# Patient Record
Sex: FEMALE | Race: WHITE | Employment: STUDENT | URBAN - METROPOLITAN AREA
[De-identification: names, ages, dates, MRNs, and addresses within clinical notes are randomized per-mention and may not be internally consistent; named-entity substitution may affect disease eponyms.]

---

## 2019-01-25 ENCOUNTER — APPOINTMENT (OUTPATIENT)
Dept: CT IMAGING | Age: 24
End: 2019-01-25
Attending: NURSE PRACTITIONER
Payer: COMMERCIAL

## 2019-01-25 ENCOUNTER — HOSPITAL ENCOUNTER (EMERGENCY)
Age: 24
Discharge: HOME | End: 2019-01-25
Attending: EMERGENCY MEDICINE | Admitting: EMERGENCY MEDICINE
Payer: COMMERCIAL

## 2019-01-25 VITALS
HEIGHT: 66 IN | SYSTOLIC BLOOD PRESSURE: 116 MMHG | HEART RATE: 89 BPM | TEMPERATURE: 97.8 F | BODY MASS INDEX: 24.27 KG/M2 | DIASTOLIC BLOOD PRESSURE: 78 MMHG | WEIGHT: 151.01 LBS | RESPIRATION RATE: 17 BRPM | OXYGEN SATURATION: 98 %

## 2019-01-25 DIAGNOSIS — N83.201 CYSTS OF BOTH OVARIES: Primary | ICD-10-CM

## 2019-01-25 DIAGNOSIS — N83.202 CYSTS OF BOTH OVARIES: Primary | ICD-10-CM

## 2019-01-25 LAB
ANION GAP BLD CALC-SCNC: 18 MMOL/L (ref 10–20)
BUN BLD-MCNC: 5 MG/DL (ref 9–20)
CA-I BLD-MCNC: 1.28 MMOL/L (ref 1.12–1.32)
CHLORIDE BLD-SCNC: 103 MMOL/L (ref 98–107)
CO2 BLD-SCNC: 27 MMOL/L (ref 21–32)
CREAT BLD-MCNC: 0.7 MG/DL (ref 0.6–1.3)
GLUCOSE BLD-MCNC: 96 MG/DL (ref 65–100)
HCT VFR BLD CALC: 47 % (ref 35–47)
POTASSIUM BLD-SCNC: 4 MMOL/L (ref 3.5–5.1)
SERVICE CMNT-IMP: ABNORMAL
SODIUM BLD-SCNC: 143 MMOL/L (ref 136–145)

## 2019-01-25 PROCEDURE — 74011636320 HC RX REV CODE- 636/320: Performed by: RADIOLOGY

## 2019-01-25 PROCEDURE — 74177 CT ABD & PELVIS W/CONTRAST: CPT

## 2019-01-25 PROCEDURE — 80047 BASIC METABLC PNL IONIZED CA: CPT

## 2019-01-25 PROCEDURE — 99283 EMERGENCY DEPT VISIT LOW MDM: CPT

## 2019-01-25 PROCEDURE — 74011000258 HC RX REV CODE- 258: Performed by: RADIOLOGY

## 2019-01-25 PROCEDURE — 96360 HYDRATION IV INFUSION INIT: CPT

## 2019-01-25 RX ORDER — SODIUM CHLORIDE 0.9 % (FLUSH) 0.9 %
10 SYRINGE (ML) INJECTION
Status: COMPLETED | OUTPATIENT
Start: 2019-01-25 | End: 2019-01-25

## 2019-01-25 RX ADMIN — SODIUM CHLORIDE 100 ML: 900 INJECTION, SOLUTION INTRAVENOUS at 17:21

## 2019-01-25 RX ADMIN — Medication 10 ML: at 17:21

## 2019-01-25 RX ADMIN — IOPAMIDOL 100 ML: 755 INJECTION, SOLUTION INTRAVENOUS at 17:21

## 2019-01-25 NOTE — ED TRIAGE NOTES
Patient is transitioning to male and prefers the name Veenome. Arrived form home c/o ower right abdominal pain fo 6-7 months. Past few days has been more constant.

## 2019-01-25 NOTE — DISCHARGE INSTRUCTIONS
Thank you for allowing us to care for you today. Please follow-up with your Primary Care provider in the next 2-3 days if your symptoms do not improve. Plan for home:     CT with bilateral ovarian cysts. No other pathology was found. Your appendix is OK. Come back to the ER if you have worsening symptoms, fevers over 100.9, shaking chills, nausea or vomiting. Patient Education        Functional Ovarian Cyst: Care Instructions  Your Care Instructions    A functional ovarian cyst is a sac that forms on the surface of a woman's ovary during ovulation. The sac holds a maturing egg. Usually the sac goes away after the egg is released. But if the egg is not released, or if the sac closes up after the egg is released, the sac can swell up with fluid and form a cyst.  Functional ovarian cysts are different than ovarian growths caused by other problems, such as cancer. Most functional ovarian cysts cause no symptoms and go away on their own. Some cause mild pain. Others can cause severe pain when they rupture or bleed. Follow-up care is a key part of your treatment and safety. Be sure to make and go to all appointments, and call your doctor if you are having problems. It's also a good idea to know your test results and keep a list of the medicines you take. How can you care for yourself at home? · Use heat, such as a hot water bottle, a heating pad set on low, or a warm bath, to relax tense muscles and relieve cramping. · Be safe with medicines. Take pain medicines exactly as directed. ? If the doctor gave you a prescription medicine for pain, take it as prescribed. ? If you are not taking a prescription pain medicine, ask your doctor if you can take an over-the-counter medicine. · Avoid constipation. Make sure you drink enough fluids and include fruits, vegetables, and fiber in your diet each day. Constipation does not cause ovarian cysts, but it may make your pelvic pain worse.   When should you call for help? Call your doctor now or seek immediate medical care if:    · You have severe vaginal bleeding.     · You have new or worse belly or pelvic pain.    Watch closely for changes in your health, and be sure to contact your doctor if:    · You have unusual vaginal bleeding.     · You do not get better as expected. Where can you learn more? Go to http://ernesto-meghan.info/. Enter I589 in the search box to learn more about \"Functional Ovarian Cyst: Care Instructions. \"  Current as of: May 14, 2018  Content Version: 11.9  © 6982-8799 [employer] Care instructions adapted under license by [employer] (which disclaims liability or warranty for this information). If you have questions about a medical condition or this instruction, always ask your healthcare professional. [address] any warranty or liability for your use of this information.

## 2019-01-25 NOTE — ED PROVIDER NOTES
Prefers to be called \"Alvaro\". Female transitioning to male. CC: Abdominal pain RLQ abdominal pain. Deep achy feeling. Comes in waves today. Was more constant in previous few days. Had labs, US and pelvic exam 
 
Constipation, BRBPR when having BM LMP: irregular. On Testosterone Denies chronic illness No correlation of symptoms to testosterone use. The history is provided by the patient. No  was used. Abdominal Pain This is a recurrent problem. Associated symptoms include constipation. Pertinent negatives include no fever, no diarrhea, no nausea, no vomiting, no dysuria, no headaches, no chest pain and no back pain. Past workup includes ultrasound. Her past medical history does not include PUD, gallstones, GERD, ulcerative colitis, Crohn's disease, irritable bowel syndrome, UTI, kidney stones or small bowel obstruction. The patient's surgical history non-contributory. No past medical history on file. No past surgical history on file. No family history on file. Social History Socioeconomic History  Marital status: Not on file Spouse name: Not on file  Number of children: Not on file  Years of education: Not on file  Highest education level: Not on file Social Needs  Financial resource strain: Not on file  Food insecurity - worry: Not on file  Food insecurity - inability: Not on file  Transportation needs - medical: Not on file  Transportation needs - non-medical: Not on file Occupational History  Not on file Tobacco Use  Smoking status: Not on file Substance and Sexual Activity  Alcohol use: Not on file  Drug use: Not on file  Sexual activity: Not on file Other Topics Concern  Not on file Social History Narrative  Not on file ALLERGIES: Patient has no allergy information on record. Review of Systems Constitutional: Negative for appetite change, chills and fever. HENT: Negative. Respiratory: Negative for cough, shortness of breath and wheezing. Cardiovascular: Negative for chest pain. Gastrointestinal: Positive for abdominal pain and constipation. Negative for diarrhea, nausea and vomiting. Genitourinary: Negative for dysuria and urgency. Musculoskeletal: Negative for back pain. Skin: Negative for color change and rash. Neurological: Negative for dizziness and headaches. Psychiatric/Behavioral: Negative. All other systems reviewed and are negative. Vitals:  
 01/25/19 1422 01/25/19 1449 BP:  114/79 Pulse: (!) 102 90 Resp:  17 Temp:  97.9 °F (36.6 °C) SpO2: 99% 97% Physical Exam  
Constitutional: She is oriented to person, place, and time. She appears well-developed and well-nourished. HENT:  
Head: Normocephalic and atraumatic. Neck: Normal range of motion. Neck supple. Cardiovascular: Normal rate, regular rhythm, normal heart sounds and intact distal pulses. Pulmonary/Chest: Effort normal and breath sounds normal. No respiratory distress. She has no wheezes. She has no rales. She exhibits no tenderness. Abdominal: Soft. Normal appearance and bowel sounds are normal. She exhibits no shifting dullness, no distension, no abdominal bruit, no ascites and no mass. There is tenderness in the right lower quadrant. There is no rigidity, no rebound, no guarding, no CVA tenderness and negative Bower's sign. Musculoskeletal: Normal range of motion. Neurological: She is alert and oriented to person, place, and time. Skin: Skin is warm and dry. No erythema. Psychiatric: She has a normal mood and affect. Her behavior is normal. Judgment and thought content normal.  
Nursing note and vitals reviewed. [facility] Procedures Assessment & Plan:  
 
Orders Placed This Encounter  CT ABDOMEN PELVIS WITH CONTRAST  POC CHEM8 Discussed with Nelli Saeed, *,ED Provider Joanna Poole NP 
01/25/19 
2:58 PM 
 CT with ovarian cysts. No other acute pathology. Follow-up with PCP. Discussed return precautions. 6:44 PM 
The patient has been reevaluated. The patient is ready for discharge. The patient's signs, symptoms, diagnosis, and discharge instructions have been discussed and the patient/ family has conveyed their understanding. The patient is to follow up as recommended or return to the ED should their symptoms worsen. Plan has been discussed and the patient is in agreement. LABORATORY TESTS: 
Labs Reviewed POC CHEM8 - Abnormal; Notable for the following components:  
    Result Value BUN (POC) 5 (*) All other components within normal limits POC CHEM8 IMAGING RESULTS: 
Ct Abd Pelv W Cont Result Date: 1/25/2019 EXAM: CT ABD PELV W CONT INDICATION: RLQ abdominal pain. COMPARISON: None CONTRAST: 100 mL of Isovue-370. TECHNIQUE: Following the uneventful intravenous administration of contrast, thin axial images were obtained through the abdomen and pelvis. Coronal and sagittal reconstructions were generated. Oral contrast was not administered. CT dose reduction was achieved through use of a standardized protocol tailored for this examination and automatic exposure control for dose modulation. FINDINGS: LUNG BASES: Clear. INCIDENTALLY IMAGED HEART AND MEDIASTINUM: Unremarkable. LIVER: No mass or biliary dilatation. GALLBLADDER: Unremarkable. SPLEEN: No mass. PANCREAS: No mass or ductal dilatation. ADRENALS: Unremarkable. KIDNEYS: No mass, calculus, or hydronephrosis. STOMACH: Unremarkable. SMALL BOWEL: No dilatation or wall thickening. COLON: No dilatation or wall thickening. APPENDIX: Unremarkable PERITONEUM: Trace free fluid is seen in the right paracolic gutter. RETROPERITONEUM: No lymphadenopathy or aortic aneurysm. REPRODUCTIVE ORGANS: There is a 3.6 cm left ovarian cyst and a 13 mm right ovarian follicle. URINARY BLADDER: No mass or calculus.  Small urachal remnant is noted. BONES: No destructive bone lesion. ADDITIONAL COMMENTS: N/A IMPRESSION: Bilateral ovarian cysts. No acute abnormality. Small urachal remnant. MEDICATIONS GIVEN: 
Medications  
sodium chloride 0.9 % bolus infusion 100 mL (100 mL IntraVENous New Bag 1/25/19 1721)  
sodium chloride (NS) flush 10 mL (10 mL IntraVENous Given 1/25/19 1721) iopamidol (ISOVUE-370) 76 % injection 100 mL (100 mL IntraVENous Given 1/25/19 1721) IMPRESSION: 
1. Cysts of both ovaries PLAN: 
1. There are no discharge medications for this patient. 2.  
Follow-up Information Follow up With Specialties Details Why Contact Info [address]    [address] [facility] [address] [phone] [facility] EMERGENCY DEP Emergency Medicine  As needed, If symptoms worsen [address] [facility] [address] [phone] 3. Return to ED for new or worsening symptoms Angie Chicas NP

## 2019-01-25 NOTE — ED NOTES
18 NP reviewed discharge instructions with the patient. The patient verbalized understanding. Patient ambulated out of ED by herself. No complaints or concerns noted. 20 gauge removed form left ac, catheter intact.

## 2021-06-01 ENCOUNTER — TRANSFERRED RECORDS (OUTPATIENT)
Dept: MULTI SPECIALTY CLINIC | Facility: CLINIC | Age: 26
End: 2021-06-01
Payer: COMMERCIAL

## 2021-06-01 LAB — PAP SMEAR - HIM PATIENT REPORTED: NEGATIVE

## 2022-01-07 NOTE — PROGRESS NOTES
[facility]  Gynecology Consult Visit    SUBJECTIVE    Jas Escobar is a 26 year old G0 who is here for sterilization consult. Jas is sure they do not desire future fertility.    No current questions about salpingectomy procedure, as received surgical counseling previously; had planned on undergoing procedure through [facility] but was prohibited due to cost and unanswered questions about procedure scheduling, so would like to schedule here instead. Asks about cost of procedure. Has Paragard IUD in place, requests removal at time of procedure.     Their LMP is: one month ago    PAST MEDICAL HISTORY  Past Medical History:   Diagnosis Date     Gender dysphoria in adult        MEDICATIONS  Current Outpatient Medications   Medication     escitalopram (LEXAPRO) 10 MG tablet     paragard intrauterine copper device     Testosterone Cypionate 200 MG/ML KIT     No current facility-administered medications for this visit.       ALLERGIES  No Known Allergies    OBSTETRIC/GYNECOLOGIC HISTORY  Menarche: [age] years old.   Period cycle/length/flow/associated symptoms: Consistent period once monthly. Cramping/heavy bleeding.  Current contraception: Parguard IUD  Number of partners in last year: 1 Male  History of STDs: No  PAP 10/5/20: NIL    History of abnormal Pap smear: No    OB History    Para Term  AB Living   0 0 0 0 0 0   SAB IAB Ectopic Multiple Live Births   0 0 0 0 0       PAST SURGICAL HISTORY   Past Surgical History:   Procedure Laterality Date     MAMMOPLASTY REDUCTION  2019       SOCIAL HISTORY    Social History     Tobacco Use     Smoking status: Light Tobacco Smoker     Smokeless tobacco: Never Used   Substance Use Topics     Alcohol use: Yes     Drug use: Yes       FAMILY HISTORY  No family history on file.    REVIEW OF SYSTEMS  A 10 point review of systems including Constitutional, Eyes, Respiratory, Cardiovascular, Gastroenterology, Genitourinary, Integumentary, Musculoskeletal, and  "Psychiatric, were all negative, except for pertinent positives noted in the above HPI.    OBJECTIVE  /75   Pulse 78   Ht 1.727 m (5' 8\")   Wt 71.2 kg (157 lb)   BMI 23.87 kg/m      General: Alert, without distress  Extremities: normal  Pelvic exam: deferred    ASSESSMENT  Jas Escobar is a 26 year old G0 who is here to discuss laparoscopic bilateral salpingectomy for sterilization.    PLAN    -risks of surgery and alternatives reviewed, including injury to nearby organs, infection, bleeding, DVT. Reviewed that regret occurs in up to 30% of patients < 30 years old when sterilization is performed and that the procedure isn't reversible. Reviewed availability of other highly effective forms of contraception including the IUD like Jas currently has. Discussed possibility of post-sterilization syndrome and experiencing increased pain/bleeding with menses after surgery. They continued to express their desire for sterilization.  -Order was placed for laparoscopic bilateral salpingectomy for sterilization with removal of ParaGard. They will be contacted to schedule and to discuss financial/insurance details of the procedure.   - Jas will schedule a pre-operative exam with her PCP within 30 days of the surgery date. Discussed nature of same day surgery, expected postop recovery and time off of work.      Jt Buchanan, MS3  [employer]    OBGYN Attending Addendum    I appreciate the note above by medical student, Jt Buchanan.  I, Wendy Hudson, was present with the medical student, who participated in the service and in the documentation of the note. I have verified the history and personally performed the physical exam and medical decision making. I have edited accordingly and agree with the assessment and plan of care as documented in the note.    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

## 2022-01-21 ENCOUNTER — OFFICE VISIT (OUTPATIENT)
Dept: OBGYN | Facility: CLINIC | Age: 27
End: 2022-01-21
Attending: OBSTETRICS & GYNECOLOGY
Payer: COMMERCIAL

## 2022-01-21 VITALS
SYSTOLIC BLOOD PRESSURE: 117 MMHG | WEIGHT: 157 LBS | DIASTOLIC BLOOD PRESSURE: 75 MMHG | BODY MASS INDEX: 23.79 KG/M2 | HEIGHT: 68 IN | HEART RATE: 78 BPM

## 2022-01-21 DIAGNOSIS — Z30.2 ENCOUNTER FOR STERILIZATION: Primary | ICD-10-CM

## 2022-01-21 PROBLEM — Z30.432 ENCOUNTER FOR IUD REMOVAL: Status: ACTIVE | Noted: 2022-01-21

## 2022-01-21 PROCEDURE — 99203 OFFICE O/P NEW LOW 30 MIN: CPT | Performed by: OBSTETRICS & GYNECOLOGY

## 2022-01-21 RX ORDER — ESCITALOPRAM OXALATE 10 MG/1
10 TABLET ORAL
COMMUNITY
Start: 2020-07-29 | End: 2022-06-01

## 2022-01-21 RX ORDER — ACETAMINOPHEN 325 MG/1
975 TABLET ORAL ONCE
Status: CANCELLED | OUTPATIENT
Start: 2022-01-21 | End: 2022-01-21

## 2022-01-21 RX ORDER — COPPER 313.4 MG/1
1 INTRAUTERINE DEVICE INTRAUTERINE ONCE
COMMUNITY
End: 2022-03-25

## 2022-01-21 ASSESSMENT — MIFFLIN-ST. JEOR: SCORE: 1505.65

## 2022-01-21 NOTE — LETTER
2022       RE: Carol Escobar  [address]  [place] [address]     Dear Colleague,    Thank you for referring your patient, Carol Escobar, to the [facility] [place] [facility]. Please see a copy of my visit note below.    [facility]  Gynecology Consult Visit    SUBJECTIVE    Jas Escobar is a 26 year old G0 who is here for sterilization consult. Jas is sure they do not desire future fertility.    No current questions about salpingectomy procedure, as received surgical counseling previously; had planned on undergoing procedure through [facility] but was prohibited due to cost and unanswered questions about procedure scheduling, so would like to schedule here instead. Asks about cost of procedure. Has Paragard IUD in place, requests removal at time of procedure.     Their LMP is: one month ago    PAST MEDICAL HISTORY  Past Medical History:   Diagnosis Date     Gender dysphoria in adult        MEDICATIONS  Current Outpatient Medications   Medication     escitalopram (LEXAPRO) 10 MG tablet     paragard intrauterine copper device     Testosterone Cypionate 200 MG/ML KIT     No current facility-administered medications for this visit.       ALLERGIES  No Known Allergies    OBSTETRIC/GYNECOLOGIC HISTORY  Menarche: 11/13 years old.   Period cycle/length/flow/associated symptoms: Consistent period once monthly. Cramping/heavy bleeding.  Current contraception: Parguard IUD  Number of partners in last year: 1 Male  History of STDs: No  PAP 10/5/20: NIL    History of abnormal Pap smear: No    OB History    Para Term  AB Living   0 0 0 0 0 0   SAB IAB Ectopic Multiple Live Births   0 0 0 0 0       PAST SURGICAL HISTORY   Past Surgical History:   Procedure Laterality Date     MAMMOPLASTY REDUCTION         SOCIAL HISTORY    Social History     Tobacco Use     Smoking status: Light Tobacco  "Smoker     Smokeless tobacco: Never Used   Substance Use Topics     Alcohol use: Yes     Drug use: Yes       FAMILY HISTORY  No family history on file.    REVIEW OF SYSTEMS  A 10 point review of systems including Constitutional, Eyes, Respiratory, Cardiovascular, Gastroenterology, Genitourinary, Integumentary, Musculoskeletal, and Psychiatric, were all negative, except for pertinent positives noted in the above HPI.    OBJECTIVE  /75   Pulse 78   Ht 1.727 m (5' 8\")   Wt 71.2 kg (157 lb)   BMI 23.87 kg/m      General: Alert, without distress  Extremities: normal  Pelvic exam: deferred    ASSESSMENT  Jsa Escobar is a 26 year old G0 who is here to discuss laparoscopic bilateral salpingectomy for sterilization.    PLAN    -risks of surgery and alternatives reviewed, including injury to nearby organs, infection, bleeding, DVT. Reviewed that regret occurs in up to 30% of patients < 30 years old when sterilization is performed and that the procedure isn't reversible. Reviewed availability of other highly effective forms of contraception including the IUD like Jas currently has. Discussed possibility of post-sterilization syndrome and experiencing increased pain/bleeding with menses after surgery. They continued to express their desire for sterilization.  -Order was placed for laparoscopic bilateral salpingectomy for sterilization with removal of ParaGard. They will be contacted to schedule and to discuss financial/insurance details of the procedure.   - Jas will schedule a pre-operative exam with her PCP within 30 days of the surgery date. Discussed nature of same day surgery, expected postop recovery and time off of work.      Jt Buchanan, MS3  [employer]    OBGYN Attending Addendum    I appreciate the note above by medical student, Jt Buchanan.  I, Wendy Hudson, was present with the medical student, who participated in the service and in the documentation of the note. I have " verified the history and personally performed the physical exam and medical decision making. I have edited accordingly and agree with the assessment and plan of care as documented in the note.    Wendy Hudson MD, MSCI    [facility]  Date of Service: 1/21/22

## 2022-02-07 ENCOUNTER — TRANSCRIBE ORDERS (OUTPATIENT)
Dept: OBGYN | Facility: CLINIC | Age: 27
End: 2022-02-07
Payer: COMMERCIAL

## 2022-02-07 DIAGNOSIS — Z01.812 ENCOUNTER FOR PRE-OPERATIVE LABORATORY TESTING: Primary | ICD-10-CM

## 2022-02-25 NOTE — PROGRESS NOTES
[facility]  [address], [address]  [facility] [address]  Phone: [phone]  Primary Provider: Clinic, [facility]      PREOPERATIVE EVALUATION:  Today's date: 2/28/2022    Carol Escobar is a 26 year old adult who presents for a preoperative evaluation.    Surgical Information:  Surgery/Procedure: laparoscopy of pelvis, bilateral salpingectomy, removal of paraguard IUD  Surgery Location: [facility]  Surgeon: Erin Perales MD  Surgery Date: 3/25/2022  Time of Surgery: 715am  Where patient plans to recover: At home with family  Fax number for surgical facility: Note does not need to be faxed, will be available electronically in Epic.    Type of Anesthesia Anticipated: General    Assessment & Plan     The proposed surgical procedure is considered INTERMEDIATE risk.    Pre-op health examination  - CBC with platelets; Future  - Basic metabolic panel  (Ca, Cl, CO2, Creat, Gluc, K, Na, BUN); Future  - patient will undergo hysteroscopy, IUD removal and bilateral salpingectomy.       Screening for HIV (human immunodeficiency virus)  - HIV Antigen Antibody Combo; Future    Need for hepatitis C screening test  - Hepatitis C Screen Reflex to HCV RNA Quant and Genotype; Future    Cervical cancer screening  Pap smear completed at [facility]. 06/2021. Normal results. Patient is due for a pap in 06/2024.          Risks and Recommendations:   - No identified additional risk factors other than previously addressed    Medication Instructions:  Patient is to take all scheduled medications on the day of surgery    RECOMMENDATION:  APPROVAL GIVEN to proceed with proposed procedure, without further diagnostic evaluation.    Isra Erickson MD  [facility]  PAGER: [phone] or (W): [phone]        Subjective     HPI related to upcoming procedure: 27 yo with hx of gender dysphoria. Patient desires to proceed with hystroscopy, IUD removal  and bilateral salpingectomy.         Preop Questions 2/28/2022   1. Have you ever had a heart attack or stroke? No   2. Have you ever had surgery on your heart or blood vessels, such as a stent placement, a coronary artery bypass, or surgery on an artery in your head, neck, heart, or legs? No   3. Do you have chest pain with activity? No   4. Do you have a history of  heart failure? No   5. Do you currently have a cold, bronchitis or symptoms of other infection? No   6. Do you have a cough, shortness of breath, or wheezing? No   7. Do you or anyone in your family have previous history of blood clots? No   8. Do you or does anyone in your family have a serious bleeding problem such as prolonged bleeding following surgeries or cuts? No   9. Have you ever had problems with anemia or been told to take iron pills? No   10. Have you had any abnormal blood loss such as black, tarry or bloody stools, or abnormal vaginal bleeding? No   11. Have you ever had a blood transfusion? No   12. Are you willing to have a blood transfusion if it is medically needed before, during, or after your surgery? Yes   13. Have you or any of your relatives ever had problems with anesthesia? No   14. Do you have sleep apnea, excessive snoring or daytime drowsiness? No   15. Do you have any artifical heart valves or other implanted medical devices like a pacemaker, defibrillator, or continuous glucose monitor? No   16. Do you have artificial joints? No   17. Are you allergic to latex? No   18. Is there any chance that you may be pregnant? No     Health Care Directive:  Patient does not have a Health Care Directive    Preoperative Review of :   reviewed - no record of controlled substances prescribed.      Review of Systems  Constitutional, neuro, ENT, endocrine, pulmonary, cardiac, gastrointestinal, genitourinary, musculoskeletal, integument and psychiatric systems are negative, except as otherwise noted.    Patient Active Problem List     "Diagnosis Date Noted     Encounter for sterilization 01/21/2022     Priority: Medium      Past Medical History:   Diagnosis Date     Depressive disorder 2009    no longer an issue     Gender dysphoria in adult      Past Surgical History:   Procedure Laterality Date     MAMMOPLASTY REDUCTION  2019     Current Outpatient Medications   Medication Sig Dispense Refill     escitalopram (LEXAPRO) 10 MG tablet Take 10 mg by mouth       paragard intrauterine copper device 1 each by Intrauterine route once       Testosterone Cypionate 200 MG/ML KIT Inject 50 mg into the muscle         No Known Allergies     Social History     Tobacco Use     Smoking status: Light Tobacco Smoker     Packs/day: 0.00     Years: 0.00     Pack years: 0.00     Smokeless tobacco: Never Used     Tobacco comment: once a month   Substance Use Topics     Alcohol use: Yes     Family History   Problem Relation Age of Onset     Hypertension Mother      Depression Mother      Anxiety Disorder Mother      Thyroid Disease Sister         Hypothyroid     History   Drug Use         Objective     /86   Pulse 90   Temp 98.5  F (36.9  C)   Ht 1.727 m (5' 8\")   Wt 68.5 kg (151 lb 1.6 oz)   SpO2 97%   BMI 22.97 kg/m      Physical Exam  GENERAL APPEARANCE: healthy, alert and no distress  HENT: ear canals and TM's normal and nose and mouth without ulcers or lesions  RESP: lungs clear to auscultation - no rales, rhonchi or wheezes  CV: regular rate and rhythm, normal S1 S2, no S3 or S4 and no murmur, click or rub   ABDOMEN: soft, nontender, no HSM or masses and bowel sounds normal  NEURO: Normal strength and tone, sensory exam grossly normal, mentation intact and speech normal    No results for input(s): HGB, PLT, INR, NA, POTASSIUM, CR, A1C in the last 66668 hours.     Diagnostics:  Recent Results (from the past 24 hour(s))   CBC with platelets    Collection Time: 02/28/22  2:18 PM   Result Value Ref Range    WBC Count 4.5 4.0 - 11.0 10e3/uL    RBC Count " 5.18 3.80 - 5.90 10e6/uL    Hemoglobin 15.1 11.7 - 17.7 g/dL    Hematocrit 45.7 35.0 - 53.0 %    MCV 88 78 - 100 fL    MCH 29.2 26.5 - 33.0 pg    MCHC 33.0 31.5 - 36.5 g/dL    RDW 12.8 10.0 - 15.0 %    Platelet Count 268 150 - 450 10e3/uL      No EKG required for low risk surgery (cataract, skin procedure, breast biopsy, etc).    Revised Cardiac Risk Index (RCRI):  The patient has the following serious cardiovascular risks for perioperative complications:   - No serious cardiac risks = 0 points     RCRI Interpretation: 0 points: Class I (very low risk - 0.4% complication rate)         Signed Electronically by: Isra Erickson MD  Copy of this evaluation report is provided to requesting physician.

## 2022-02-25 NOTE — H&P (VIEW-ONLY)
[facility]  [address], [address]  [facility] [address]  Phone: [phone]  Primary Provider: Clinic, [facility]      PREOPERATIVE EVALUATION:  Today's date: 2/28/2022    Carol Escobar is a 26 year old adult who presents for a preoperative evaluation.    Surgical Information:  Surgery/Procedure: laparoscopy of pelvis, bilateral salpingectomy, removal of paraguard IUD  Surgery Location: [facility]  Surgeon: Erin Perales MD  Surgery Date: 3/25/2022  Time of Surgery: 715am  Where patient plans to recover: At home with family  Fax number for surgical facility: Note does not need to be faxed, will be available electronically in Epic.    Type of Anesthesia Anticipated: General    Assessment & Plan     The proposed surgical procedure is considered INTERMEDIATE risk.    Pre-op health examination  - CBC with platelets; Future  - Basic metabolic panel  (Ca, Cl, CO2, Creat, Gluc, K, Na, BUN); Future  - patient will undergo hysteroscopy, IUD removal and bilateral salpingectomy.       Screening for HIV (human immunodeficiency virus)  - HIV Antigen Antibody Combo; Future    Need for hepatitis C screening test  - Hepatitis C Screen Reflex to HCV RNA Quant and Genotype; Future    Cervical cancer screening  Pap smear completed at [facility]. 06/2021. Normal results. Patient is due for a pap in 06/2024.          Risks and Recommendations:   - No identified additional risk factors other than previously addressed    Medication Instructions:  Patient is to take all scheduled medications on the day of surgery    RECOMMENDATION:  APPROVAL GIVEN to proceed with proposed procedure, without further diagnostic evaluation.    Isar Erickson MD  [facility]  PAGER: [phone] or (W): [phone]        Subjective     HPI related to upcoming procedure: 25 yo with hx of gender dysphoria. Patient desires to proceed with hystroscopy, IUD removal  and bilateral salpingectomy.         Preop Questions 2/28/2022   1. Have you ever had a heart attack or stroke? No   2. Have you ever had surgery on your heart or blood vessels, such as a stent placement, a coronary artery bypass, or surgery on an artery in your head, neck, heart, or legs? No   3. Do you have chest pain with activity? No   4. Do you have a history of  heart failure? No   5. Do you currently have a cold, bronchitis or symptoms of other infection? No   6. Do you have a cough, shortness of breath, or wheezing? No   7. Do you or anyone in your family have previous history of blood clots? No   8. Do you or does anyone in your family have a serious bleeding problem such as prolonged bleeding following surgeries or cuts? No   9. Have you ever had problems with anemia or been told to take iron pills? No   10. Have you had any abnormal blood loss such as black, tarry or bloody stools, or abnormal vaginal bleeding? No   11. Have you ever had a blood transfusion? No   12. Are you willing to have a blood transfusion if it is medically needed before, during, or after your surgery? Yes   13. Have you or any of your relatives ever had problems with anesthesia? No   14. Do you have sleep apnea, excessive snoring or daytime drowsiness? No   15. Do you have any artifical heart valves or other implanted medical devices like a pacemaker, defibrillator, or continuous glucose monitor? No   16. Do you have artificial joints? No   17. Are you allergic to latex? No   18. Is there any chance that you may be pregnant? No     Health Care Directive:  Patient does not have a Health Care Directive    Preoperative Review of :   reviewed - no record of controlled substances prescribed.      Review of Systems  Constitutional, neuro, ENT, endocrine, pulmonary, cardiac, gastrointestinal, genitourinary, musculoskeletal, integument and psychiatric systems are negative, except as otherwise noted.    Patient Active Problem List     "Diagnosis Date Noted     Encounter for sterilization 01/21/2022     Priority: Medium      Past Medical History:   Diagnosis Date     Depressive disorder 2009    no longer an issue     Gender dysphoria in adult      Past Surgical History:   Procedure Laterality Date     MAMMOPLASTY REDUCTION  2019     Current Outpatient Medications   Medication Sig Dispense Refill     escitalopram (LEXAPRO) 10 MG tablet Take 10 mg by mouth       paragard intrauterine copper device 1 each by Intrauterine route once       Testosterone Cypionate 200 MG/ML KIT Inject 50 mg into the muscle         No Known Allergies     Social History     Tobacco Use     Smoking status: Light Tobacco Smoker     Packs/day: 0.00     Years: 0.00     Pack years: 0.00     Smokeless tobacco: Never Used     Tobacco comment: once a month   Substance Use Topics     Alcohol use: Yes     Family History   Problem Relation Age of Onset     Hypertension Mother      Depression Mother      Anxiety Disorder Mother      Thyroid Disease Sister         Hypothyroid     History   Drug Use         Objective     /86   Pulse 90   Temp 98.5  F (36.9  C)   Ht 1.727 m (5' 8\")   Wt 68.5 kg (151 lb 1.6 oz)   SpO2 97%   BMI 22.97 kg/m      Physical Exam  GENERAL APPEARANCE: healthy, alert and no distress  HENT: ear canals and TM's normal and nose and mouth without ulcers or lesions  RESP: lungs clear to auscultation - no rales, rhonchi or wheezes  CV: regular rate and rhythm, normal S1 S2, no S3 or S4 and no murmur, click or rub   ABDOMEN: soft, nontender, no HSM or masses and bowel sounds normal  NEURO: Normal strength and tone, sensory exam grossly normal, mentation intact and speech normal    No results for input(s): HGB, PLT, INR, NA, POTASSIUM, CR, A1C in the last 22646 hours.     Diagnostics:  Recent Results (from the past 24 hour(s))   CBC with platelets    Collection Time: 02/28/22  2:18 PM   Result Value Ref Range    WBC Count 4.5 4.0 - 11.0 10e3/uL    RBC Count " 5.18 3.80 - 5.90 10e6/uL    Hemoglobin 15.1 11.7 - 17.7 g/dL    Hematocrit 45.7 35.0 - 53.0 %    MCV 88 78 - 100 fL    MCH 29.2 26.5 - 33.0 pg    MCHC 33.0 31.5 - 36.5 g/dL    RDW 12.8 10.0 - 15.0 %    Platelet Count 268 150 - 450 10e3/uL      No EKG required for low risk surgery (cataract, skin procedure, breast biopsy, etc).    Revised Cardiac Risk Index (RCRI):  The patient has the following serious cardiovascular risks for perioperative complications:   - No serious cardiac risks = 0 points     RCRI Interpretation: 0 points: Class I (very low risk - 0.4% complication rate)         Signed Electronically by: Isra Erickson MD  Copy of this evaluation report is provided to requesting physician.

## 2022-02-28 ENCOUNTER — OFFICE VISIT (OUTPATIENT)
Dept: FAMILY MEDICINE | Facility: CLINIC | Age: 27
End: 2022-02-28
Payer: COMMERCIAL

## 2022-02-28 VITALS
OXYGEN SATURATION: 97 % | BODY MASS INDEX: 22.9 KG/M2 | HEIGHT: 68 IN | DIASTOLIC BLOOD PRESSURE: 86 MMHG | SYSTOLIC BLOOD PRESSURE: 123 MMHG | WEIGHT: 151.1 LBS | HEART RATE: 90 BPM | TEMPERATURE: 98.5 F

## 2022-02-28 DIAGNOSIS — Z11.4 SCREENING FOR HIV (HUMAN IMMUNODEFICIENCY VIRUS): ICD-10-CM

## 2022-02-28 DIAGNOSIS — Z01.818 PREOP GENERAL PHYSICAL EXAM: Primary | ICD-10-CM

## 2022-02-28 DIAGNOSIS — Z12.4 CERVICAL CANCER SCREENING: ICD-10-CM

## 2022-02-28 DIAGNOSIS — Z11.59 NEED FOR HEPATITIS C SCREENING TEST: ICD-10-CM

## 2022-02-28 LAB
ERYTHROCYTE [DISTWIDTH] IN BLOOD BY AUTOMATED COUNT: 12.8 % (ref 10–15)
HCT VFR BLD AUTO: 45.7 % (ref 35–53)
HGB BLD-MCNC: 15.1 G/DL (ref 11.7–17.7)
MCH RBC QN AUTO: 29.2 PG (ref 26.5–33)
MCHC RBC AUTO-ENTMCNC: 33 G/DL (ref 31.5–36.5)
MCV RBC AUTO: 88 FL (ref 78–100)
PLATELET # BLD AUTO: 268 10E3/UL (ref 150–450)
RBC # BLD AUTO: 5.18 10E6/UL (ref 3.8–5.9)
WBC # BLD AUTO: 4.5 10E3/UL (ref 4–11)

## 2022-02-28 PROCEDURE — 36415 COLL VENOUS BLD VENIPUNCTURE: CPT | Performed by: FAMILY MEDICINE

## 2022-02-28 PROCEDURE — 80048 BASIC METABOLIC PNL TOTAL CA: CPT | Performed by: FAMILY MEDICINE

## 2022-02-28 PROCEDURE — 87389 HIV-1 AG W/HIV-1&-2 AB AG IA: CPT | Performed by: FAMILY MEDICINE

## 2022-02-28 PROCEDURE — 99204 OFFICE O/P NEW MOD 45 MIN: CPT | Performed by: FAMILY MEDICINE

## 2022-02-28 PROCEDURE — 86803 HEPATITIS C AB TEST: CPT | Performed by: FAMILY MEDICINE

## 2022-02-28 PROCEDURE — 85027 COMPLETE CBC AUTOMATED: CPT | Performed by: FAMILY MEDICINE

## 2022-02-28 NOTE — Clinical Note
Pap smear completed at [facility]. 06/2021. Normal results. Patient is due for a pap in 06/2024.

## 2022-03-01 LAB
ANION GAP SERPL CALCULATED.3IONS-SCNC: 8 MMOL/L (ref 3–14)
BUN SERPL-MCNC: 12 MG/DL (ref 7–30)
CALCIUM SERPL-MCNC: 9.3 MG/DL (ref 8.5–10.1)
CHLORIDE BLD-SCNC: 107 MMOL/L (ref 94–109)
CO2 SERPL-SCNC: 24 MMOL/L (ref 20–32)
CREAT SERPL-MCNC: 0.87 MG/DL (ref 0.52–1.25)
GFR SERPL CREATININE-BSD FRML MDRD: >90 ML/MIN/1.73M2
GLUCOSE BLD-MCNC: 84 MG/DL (ref 70–99)
HCV AB SERPL QL IA: NONREACTIVE
HIV 1+2 AB+HIV1 P24 AG SERPL QL IA: NONREACTIVE
POTASSIUM BLD-SCNC: 4.4 MMOL/L (ref 3.4–5.3)
SODIUM SERPL-SCNC: 139 MMOL/L (ref 133–144)

## 2022-03-22 ENCOUNTER — LAB (OUTPATIENT)
Dept: LAB | Facility: CLINIC | Age: 27
End: 2022-03-22
Attending: OBSTETRICS & GYNECOLOGY
Payer: COMMERCIAL

## 2022-03-22 ENCOUNTER — TELEPHONE (OUTPATIENT)
Dept: FAMILY MEDICINE | Facility: CLINIC | Age: 27
End: 2022-03-22

## 2022-03-22 DIAGNOSIS — Z01.812 ENCOUNTER FOR PRE-OPERATIVE LABORATORY TESTING: ICD-10-CM

## 2022-03-22 LAB — SARS-COV-2 RNA RESP QL NAA+PROBE: NEGATIVE

## 2022-03-22 PROCEDURE — U0005 INFEC AGEN DETEC AMPLI PROBE: HCPCS

## 2022-03-22 PROCEDURE — U0003 INFECTIOUS AGENT DETECTION BY NUCLEIC ACID (DNA OR RNA); SEVERE ACUTE RESPIRATORY SYNDROME CORONAVIRUS 2 (SARS-COV-2) (CORONAVIRUS DISEASE [COVID-19]), AMPLIFIED PROBE TECHNIQUE, MAKING USE OF HIGH THROUGHPUT TECHNOLOGIES AS DESCRIBED BY CMS-2020-01-R: HCPCS

## 2022-03-22 NOTE — TELEPHONE ENCOUNTER
Tamra,      Patient called.  Scheduled for Salpingectomy    Requesting requesting diagnostics code for  CPT: 89828  Is this something you can help with?    No sure where to send this message.    Vidya Harper RN

## 2022-03-23 NOTE — TELEPHONE ENCOUNTER
Tamra Cui Elizabeth, RN  Caller: Unspecified (Yesterday,  5:07 PM)  Marc Dasilva,     This patient already had a salpingectomy done (CPT procedure code 93341). Marialuisa Vasquez already coded this with diagnostic code Z30.2 (I've left [place] actually and transferred to hospitalist coding so things are in limbo for a little while).   Anyways Z30.2 diagnosis code is encounter for sterilization is what was documented by Dr. Erickson and coded.   Normally salpingectomy is done for cervical cancer or bladder cancers it looks like normally.... so I hope she isn't having issues with her insurance but any further questions she can contact the business office and if needed they can do another official coding review.   Business office:   [phone]     Thanks!     Tamra

## 2022-03-24 ENCOUNTER — ANESTHESIA EVENT (OUTPATIENT)
Dept: SURGERY | Facility: AMBULATORY SURGERY CENTER | Age: 27
End: 2022-03-24
Payer: COMMERCIAL

## 2022-03-25 ENCOUNTER — HOSPITAL ENCOUNTER (OUTPATIENT)
Facility: AMBULATORY SURGERY CENTER | Age: 27
Discharge: HOME | End: 2022-03-25
Attending: OBSTETRICS & GYNECOLOGY
Payer: COMMERCIAL

## 2022-03-25 ENCOUNTER — ANESTHESIA (OUTPATIENT)
Dept: SURGERY | Facility: AMBULATORY SURGERY CENTER | Age: 27
End: 2022-03-25
Payer: COMMERCIAL

## 2022-03-25 VITALS
WEIGHT: 150 LBS | HEART RATE: 75 BPM | TEMPERATURE: 99.1 F | OXYGEN SATURATION: 99 % | DIASTOLIC BLOOD PRESSURE: 61 MMHG | BODY MASS INDEX: 23.54 KG/M2 | SYSTOLIC BLOOD PRESSURE: 109 MMHG | HEIGHT: 67 IN | RESPIRATION RATE: 12 BRPM

## 2022-03-25 DIAGNOSIS — Z30.2 ENCOUNTER FOR STERILIZATION: ICD-10-CM

## 2022-03-25 LAB
B-HCG SERPL-ACNC: <1 IU/L (ref 0–5)
BASOPHILS # BLD AUTO: 0 10E3/UL (ref 0–0.2)
BASOPHILS NFR BLD AUTO: 0 %
EOSINOPHIL # BLD AUTO: 0.2 10E3/UL (ref 0–0.7)
EOSINOPHIL NFR BLD AUTO: 2 %
ERYTHROCYTE [DISTWIDTH] IN BLOOD BY AUTOMATED COUNT: 12.6 % (ref 10–15)
HCG UR QL: NEGATIVE
HCT VFR BLD AUTO: 43.4 % (ref 35–53)
HGB BLD-MCNC: 14.2 G/DL (ref 11.7–17.7)
IMM GRANULOCYTES # BLD: 0 10E3/UL
IMM GRANULOCYTES NFR BLD: 0 %
INTERNAL QC OK POCT: NORMAL
LYMPHOCYTES # BLD AUTO: 2.3 10E3/UL (ref 0.8–5.3)
LYMPHOCYTES NFR BLD AUTO: 35 %
MCH RBC QN AUTO: 28.2 PG (ref 26.5–33)
MCHC RBC AUTO-ENTMCNC: 32.7 G/DL (ref 31.5–36.5)
MCV RBC AUTO: 86 FL (ref 78–100)
MONOCYTES # BLD AUTO: 0.6 10E3/UL (ref 0–1.3)
MONOCYTES NFR BLD AUTO: 9 %
NEUTROPHILS # BLD AUTO: 3.4 10E3/UL (ref 1.6–8.3)
NEUTROPHILS NFR BLD AUTO: 54 %
NRBC # BLD AUTO: 0 10E3/UL
NRBC BLD AUTO-RTO: 0 /100
PLATELET # BLD AUTO: 272 10E3/UL (ref 150–450)
POCT KIT EXPIRATION DATE: NORMAL
POCT KIT LOT NUMBER: NORMAL
RBC # BLD AUTO: 5.04 10E6/UL (ref 3.8–5.9)
WBC # BLD AUTO: 6.5 10E3/UL (ref 4–11)

## 2022-03-25 PROCEDURE — 84702 CHORIONIC GONADOTROPIN TEST: CPT | Performed by: PATHOLOGY

## 2022-03-25 PROCEDURE — 85025 COMPLETE CBC W/AUTO DIFF WBC: CPT | Performed by: PATHOLOGY

## 2022-03-25 PROCEDURE — 81025 URINE PREGNANCY TEST: CPT | Performed by: PATHOLOGY

## 2022-03-25 PROCEDURE — 58301 REMOVE INTRAUTERINE DEVICE: CPT

## 2022-03-25 PROCEDURE — 58670 LAPAROSCOPY TUBAL CAUTERY: CPT | Mod: GC | Performed by: OBSTETRICS & GYNECOLOGY

## 2022-03-25 PROCEDURE — 58301 REMOVE INTRAUTERINE DEVICE: CPT | Mod: GC | Performed by: OBSTETRICS & GYNECOLOGY

## 2022-03-25 PROCEDURE — 88302 TISSUE EXAM BY PATHOLOGIST: CPT | Mod: TC | Performed by: OBSTETRICS & GYNECOLOGY

## 2022-03-25 PROCEDURE — 58670 LAPAROSCOPY TUBAL CAUTERY: CPT

## 2022-03-25 RX ORDER — KETOROLAC TROMETHAMINE 30 MG/ML
INJECTION, SOLUTION INTRAMUSCULAR; INTRAVENOUS PRN
Status: DISCONTINUED | OUTPATIENT
Start: 2022-03-25 | End: 2022-03-25

## 2022-03-25 RX ORDER — MEPERIDINE HYDROCHLORIDE 25 MG/ML
12.5 INJECTION INTRAMUSCULAR; INTRAVENOUS; SUBCUTANEOUS
Status: DISCONTINUED | OUTPATIENT
Start: 2022-03-25 | End: 2022-03-26 | Stop reason: HOSPADM

## 2022-03-25 RX ORDER — OXYCODONE HYDROCHLORIDE 5 MG/1
5 TABLET ORAL EVERY 4 HOURS PRN
Qty: 6 TABLET | Refills: 0 | Status: SHIPPED | OUTPATIENT
Start: 2022-03-25 | End: 2022-03-25

## 2022-03-25 RX ORDER — SODIUM CHLORIDE, SODIUM LACTATE, POTASSIUM CHLORIDE, CALCIUM CHLORIDE 600; 310; 30; 20 MG/100ML; MG/100ML; MG/100ML; MG/100ML
INJECTION, SOLUTION INTRAVENOUS CONTINUOUS
Status: DISCONTINUED | OUTPATIENT
Start: 2022-03-25 | End: 2022-03-26 | Stop reason: HOSPADM

## 2022-03-25 RX ORDER — BUPIVACAINE HYDROCHLORIDE 2.5 MG/ML
INJECTION, SOLUTION INFILTRATION; PERINEURAL PRN
Status: DISCONTINUED | OUTPATIENT
Start: 2022-03-25 | End: 2022-03-25 | Stop reason: HOSPADM

## 2022-03-25 RX ORDER — OXYCODONE HYDROCHLORIDE 5 MG/1
5 TABLET ORAL EVERY 4 HOURS PRN
Qty: 6 TABLET | Refills: 0 | Status: SHIPPED | OUTPATIENT
Start: 2022-03-25 | End: 2022-08-10

## 2022-03-25 RX ORDER — IBUPROFEN 200 MG
800 TABLET ORAL ONCE
Status: DISCONTINUED | OUTPATIENT
Start: 2022-03-25 | End: 2022-03-26 | Stop reason: HOSPADM

## 2022-03-25 RX ORDER — LIDOCAINE 40 MG/G
CREAM TOPICAL
Status: DISCONTINUED | OUTPATIENT
Start: 2022-03-25 | End: 2022-03-26 | Stop reason: HOSPADM

## 2022-03-25 RX ORDER — FENTANYL CITRATE 50 UG/ML
INJECTION, SOLUTION INTRAMUSCULAR; INTRAVENOUS PRN
Status: DISCONTINUED | OUTPATIENT
Start: 2022-03-25 | End: 2022-03-25

## 2022-03-25 RX ORDER — FENTANYL CITRATE 50 UG/ML
25 INJECTION, SOLUTION INTRAMUSCULAR; INTRAVENOUS EVERY 5 MIN PRN
Status: DISCONTINUED | OUTPATIENT
Start: 2022-03-25 | End: 2022-03-26 | Stop reason: HOSPADM

## 2022-03-25 RX ORDER — ONDANSETRON 2 MG/ML
4 INJECTION INTRAMUSCULAR; INTRAVENOUS EVERY 30 MIN PRN
Status: DISCONTINUED | OUTPATIENT
Start: 2022-03-25 | End: 2022-03-26 | Stop reason: HOSPADM

## 2022-03-25 RX ORDER — IBUPROFEN 600 MG/1
600 TABLET, FILM COATED ORAL EVERY 6 HOURS PRN
Qty: 24 TABLET | Refills: 0 | Status: SHIPPED | OUTPATIENT
Start: 2022-03-25

## 2022-03-25 RX ORDER — ACETAMINOPHEN 325 MG/1
975 TABLET ORAL ONCE
Status: COMPLETED | OUTPATIENT
Start: 2022-03-25 | End: 2022-03-25

## 2022-03-25 RX ORDER — ACETAMINOPHEN 325 MG/1
975 TABLET ORAL ONCE
Status: DISCONTINUED | OUTPATIENT
Start: 2022-03-25 | End: 2022-03-26 | Stop reason: HOSPADM

## 2022-03-25 RX ORDER — ONDANSETRON 2 MG/ML
INJECTION INTRAMUSCULAR; INTRAVENOUS PRN
Status: DISCONTINUED | OUTPATIENT
Start: 2022-03-25 | End: 2022-03-25

## 2022-03-25 RX ORDER — HYDROMORPHONE HYDROCHLORIDE 1 MG/ML
0.2 INJECTION, SOLUTION INTRAMUSCULAR; INTRAVENOUS; SUBCUTANEOUS EVERY 5 MIN PRN
Status: DISCONTINUED | OUTPATIENT
Start: 2022-03-25 | End: 2022-03-26 | Stop reason: HOSPADM

## 2022-03-25 RX ORDER — GABAPENTIN 300 MG/1
300 CAPSULE ORAL
Status: COMPLETED | OUTPATIENT
Start: 2022-03-25 | End: 2022-03-25

## 2022-03-25 RX ORDER — ONDANSETRON 4 MG/1
4 TABLET, ORALLY DISINTEGRATING ORAL EVERY 30 MIN PRN
Status: DISCONTINUED | OUTPATIENT
Start: 2022-03-25 | End: 2022-03-26 | Stop reason: HOSPADM

## 2022-03-25 RX ORDER — ACETAMINOPHEN 325 MG/1
650 TABLET ORAL EVERY 6 HOURS PRN
Qty: 24 TABLET | Refills: 0 | Status: SHIPPED | OUTPATIENT
Start: 2022-03-25 | End: 2022-08-10

## 2022-03-25 RX ORDER — FENTANYL CITRATE 50 UG/ML
25 INJECTION, SOLUTION INTRAMUSCULAR; INTRAVENOUS
Status: DISCONTINUED | OUTPATIENT
Start: 2022-03-25 | End: 2022-03-26 | Stop reason: HOSPADM

## 2022-03-25 RX ORDER — PROPOFOL 10 MG/ML
INJECTION, EMULSION INTRAVENOUS PRN
Status: DISCONTINUED | OUTPATIENT
Start: 2022-03-25 | End: 2022-03-25

## 2022-03-25 RX ORDER — PROPOFOL 10 MG/ML
INJECTION, EMULSION INTRAVENOUS CONTINUOUS PRN
Status: DISCONTINUED | OUTPATIENT
Start: 2022-03-25 | End: 2022-03-25

## 2022-03-25 RX ORDER — OXYCODONE HYDROCHLORIDE 5 MG/1
5 TABLET ORAL EVERY 4 HOURS PRN
Status: DISCONTINUED | OUTPATIENT
Start: 2022-03-25 | End: 2022-03-26 | Stop reason: HOSPADM

## 2022-03-25 RX ORDER — DEXAMETHASONE SODIUM PHOSPHATE 4 MG/ML
INJECTION, SOLUTION INTRA-ARTICULAR; INTRALESIONAL; INTRAMUSCULAR; INTRAVENOUS; SOFT TISSUE PRN
Status: DISCONTINUED | OUTPATIENT
Start: 2022-03-25 | End: 2022-03-25

## 2022-03-25 RX ADMIN — Medication 50 MG: at 07:22

## 2022-03-25 RX ADMIN — FENTANYL CITRATE 50 MCG: 50 INJECTION, SOLUTION INTRAMUSCULAR; INTRAVENOUS at 07:40

## 2022-03-25 RX ADMIN — PROPOFOL 200 MCG/KG/MIN: 10 INJECTION, EMULSION INTRAVENOUS at 07:20

## 2022-03-25 RX ADMIN — PROPOFOL 200 MG: 10 INJECTION, EMULSION INTRAVENOUS at 07:22

## 2022-03-25 RX ADMIN — KETOROLAC TROMETHAMINE 15 MG: 30 INJECTION, SOLUTION INTRAMUSCULAR; INTRAVENOUS at 08:36

## 2022-03-25 RX ADMIN — ACETAMINOPHEN 975 MG: 325 TABLET ORAL at 06:45

## 2022-03-25 RX ADMIN — FENTANYL CITRATE 50 MCG: 50 INJECTION, SOLUTION INTRAMUSCULAR; INTRAVENOUS at 08:39

## 2022-03-25 RX ADMIN — DEXAMETHASONE SODIUM PHOSPHATE 4 MG: 4 INJECTION, SOLUTION INTRA-ARTICULAR; INTRALESIONAL; INTRAMUSCULAR; INTRAVENOUS; SOFT TISSUE at 07:31

## 2022-03-25 RX ADMIN — FENTANYL CITRATE 50 MCG: 50 INJECTION, SOLUTION INTRAMUSCULAR; INTRAVENOUS at 08:43

## 2022-03-25 RX ADMIN — FENTANYL CITRATE 50 MCG: 50 INJECTION, SOLUTION INTRAMUSCULAR; INTRAVENOUS at 07:22

## 2022-03-25 RX ADMIN — SODIUM CHLORIDE, SODIUM LACTATE, POTASSIUM CHLORIDE, CALCIUM CHLORIDE: 600; 310; 30; 20 INJECTION, SOLUTION INTRAVENOUS at 07:01

## 2022-03-25 RX ADMIN — GABAPENTIN 300 MG: 300 CAPSULE ORAL at 06:45

## 2022-03-25 RX ADMIN — OXYCODONE HYDROCHLORIDE 5 MG: 5 TABLET ORAL at 09:05

## 2022-03-25 RX ADMIN — ONDANSETRON 4 MG: 2 INJECTION INTRAMUSCULAR; INTRAVENOUS at 08:24

## 2022-03-25 RX ADMIN — Medication 10 MG: at 08:16

## 2022-03-25 NOTE — ANESTHESIA POSTPROCEDURE EVALUATION
Patient: Carol Escobar    Procedure: Procedure(s):  LAPAROSCOPY of the pelvis, bilateral salpingectomy, removal of paragard IUD       Anesthesia Type:  General    Note:  Disposition: Outpatient   Postop Pain Control: Uneventful            Sign Out: Well controlled pain   PONV: No   Neuro/Psych: Uneventful            Sign Out: Acceptable/Baseline neuro status   Airway/Respiratory: Uneventful            Sign Out: Acceptable/Baseline resp. status   CV/Hemodynamics: Uneventful            Sign Out: Acceptable CV status   Other NRE: NONE   DID A NON-ROUTINE EVENT OCCUR? No           Last vitals:  Vitals Value Taken Time   /61 03/25/22 0907   Temp 37.3  C (99.1  F) 03/25/22 0848   Pulse 75 03/25/22 0907   Resp 15 03/25/22 0906   SpO2 99 % 03/25/22 0907   Vitals shown include unvalidated device data.    Electronically Signed By: Jesse De Los Santos DO  March 25, 2022  9:39 AM

## 2022-03-25 NOTE — OP NOTE
Gynecologic Operative Note  Name: Carol Escobar (Ash)   MRN: [id number]   : 1995   Procedure date: 3/25/22     Pre-operative diagnosis: Desire for permanent contraception, IUD removal  Post-operative diagnosis: Same, s/p below procedure     Procedure:  Laparoscopy, bilateral salpingectomy, removal of Paragard IUD   Anesthesia: General      Surgeon: Erin Perales MD  Assistant(s): Naz Serna MD PGY1     Indications: Jas is a 26 year old G0 who desires permanent contraception and Paragard IUD removal.  Alternative forms of contraception were discussed, as well as risks and benefits of the procedure. The patient elected to proceed. Informed consent was discussed and signed.    Estimated blood loss: 5 mL  Total IV fluids: 900 mL, crystalloid  Total urine output: 50 mL, clear urine  Drains:  Bello catheter  Specimens: Bilateral salpinges  Complications: None apparent   Findings: Exam under anesthesia revealed female genitalia with clitoromegaly.  Upon entry of the abdomen with the laparoscope no evidence of injury. A survey of the upper abdomen showed normal anatomy. Bilateral ureters identified with vermiculation. Normal uterus, bilateral salpinges and ovaries.    Procedure:   The patient was taken to the operating room where they were placed in the dorsal lithotomy position with feet in yellow fin stirrups. General endotracheal anesthesia was administered. An exam under anesthesia was performed. The patient was then prepped and draped in the usual sterile fashion.     A sponge stick was placed in the vagina and a bello catheter was placed into the bladder. Attention was then turned to the abdomen where 0.25% marcaine was used to infiltrate the  inferior aspect of the umbilicus. An 11-blade scalpel was used to make a 5 mm vertical incision inferior to the umbilicus and a Faith used to expand the incision. A Veress needle was initially used to access the peritoneum through the umbilical  incision. After this was unsuccessful, the peritoneum was successfully accessed under direct visualization using a 5mm trocar and the 5mm scope. CO2 gas was attached and opening pressure was less than 5 mmHg, and flow was increased to 20 L/min. Pneumoperitoneum was achieved with good tympany of the abdomen. Survey of the abdomen revealed no injury from entry and normal anatomy. The upper abdomen was visualized and photos were taken.      Attention was turned to the LLQ of the abdomen where a site 2 cm medial and 2 cm superior to the anterior superior iliac crest was noted to be free of major blood vessels, 0.25% marcaine injected, and a 5 mm horizontal skin incision made. The incision was stretched with a Faith. A 5mm port was placed under direct visualization within the abdomen. A 5 mm port was placed in the RLQ of the abdomen with similar technique under visualization. The bilateral ureters were identified and remained outside the operative field.     The right salpinx was grasped with the atraumatic grasper. The Ligasure was used to serially cauterize and transect the mesosalpinx along the fallopian up to the uterine cornua. The Ligasure was then used to cauterize and transect the salpinx, freeing it from the uterus. The right salpinx was removed through the left lower quadrant port and sent to pathology for evaluation. The surgical site was examined and was hemostatic.  The same series of steps was completed on the contralateral salpinx and hemostasis noted.    A final visual sweep of the pelvis showed no further pathology or bleeding. The ports were removed under direct visualization. The pneumoperitoneum was expelled. The skin incisions were closed using 4-0 monocryl and Dermabond. The sponge stick was removed from the vagina and the bello catheter was removed. A medium graves speculum was inserted into the vagina, Paragard IUD strings were visualized and the IUD was removed without difficulty. IUD noted to be  intact.     Instrument, sponge, and needle counts were correct times 2. The patient was extubated in the operating room and transferred to the recovery room in stable condition.    Dr. Perales was present and scrubbed for the entire procedure.     Naz Serna MD   OBGYN resident PGY1  Gyn pager: [phone]  03/25/2022 9:06 AM     I participated in all aspects of Carol Escobar's case with Dr. Serna on 3/25/2022 and agree with the operative details in this note with edits by me.     Erin Perales MD MPH

## 2022-03-25 NOTE — ANESTHESIA CARE TRANSFER NOTE
Patient: Carol Escobar    Procedure: Procedure(s):  LAPAROSCOPY of the pelvis, bilateral salpingectomy, removal of paragard IUD       Diagnosis: Encounter for sterilization [Z30.2]  Diagnosis Additional Information: No value filed.    Anesthesia Type:   General     Note:    Oropharynx: oropharynx clear of all foreign objects and spontaneously breathing  Level of Consciousness: awake  Oxygen Supplementation: face mask  Level of Supplemental Oxygen (L/min / FiO2): 10  Independent Airway: airway patency satisfactory and stable  Dentition: dentition unchanged  Vital Signs Stable: post-procedure vital signs reviewed and stable  Report to RN Given: handoff report given  Patient transferred to: PACU    Handoff Report: Identifed the Patient, Identified the Reponsible Provider, Reviewed the pertinent medical history, Discussed the surgical course, Reviewed Intra-OP anesthesia mangement and issues during anesthesia, Set expectations for post-procedure period and Allowed opportunity for questions and acknowledgement of understanding      Vitals:  Vitals Value Taken Time   /87 03/25/22 0848   Temp 37.3  C (99.1  F) 03/25/22 0848   Pulse 85 03/25/22 0848   Resp 20 03/25/22 0848   SpO2 100 % 03/25/22 0848   Vitals shown include unvalidated device data.    Electronically Signed By: CHRISTOPHER LUCERO  March 25, 2022  8:49 AM

## 2022-03-25 NOTE — BRIEF OP NOTE
Gynecologic Brief Operation Note  Name: Jas Escobar   MRN: [id number]   : 1995   Procedure date: 3/25/22    Pre-operative diagnosis:   - Desire for permanent contraception  Post-operative diagnosis: Same, s/p below procedure    Procedure: Laparoscopy, bilateral salpingectomy, removal of paraguard IUD    Anesthesia: General     Surgeon: Erin Perales MD  Assistant(s): Naz Serna MD PGY1    Indications:  Jas is a 26 year old G0 who desires permanent sterilization. Also with desire for Paraguard IUD removal.  Alternative forms of contraception were discussed, as well as risks and benefits of the procedure. The patient elected to proceed. Informed consent was discussed and signed.    Estimated blood loss: 5mL  Total IV fluids: 900 mL, Lactated Ringer  Urine output: 50ml, clear urine   Drains:  Macdonald catheter  Specimens: Bilateral fallopian tubes   Findings:    Exam under anesthesia with female external genitalia.  Upon entry of the abdomen with the laparoscope no evidence of injury.  A survey of the upper abdomen showed normal anatomy. Bilateral ureters identified. Normal uterus, bilateral fallopian tubes and ovaries    Complications: None apparent   Condition: Patient taken to recovery in stable condition.    Naz Serna MD   OBGYN resident PGY1  Gyn pager: [phone]  2022 8:41 AM

## 2022-03-25 NOTE — ANESTHESIA PREPROCEDURE EVALUATION
Anesthesia Pre-Procedure Evaluation    Patient: Carol Escobar   MRN: [id number] : 1995        Procedure : Procedure(s):  LAPAROSCOPY of the pelvis, bilateral salpingectomy, removal of paragard IUD          Past Medical History:   Diagnosis Date     Depressive disorder 2009    no longer an issue     Gender dysphoria in adult       Past Surgical History:   Procedure Laterality Date     MAMMOPLASTY REDUCTION  2019      No Known Allergies   Social History     Tobacco Use     Smoking status: Light Tobacco Smoker     Packs/day: 0.00     Years: 0.00     Pack years: 0.00     Smokeless tobacco: Never Used     Tobacco comment: once a month   Substance Use Topics     Alcohol use: Yes     Comment: One to two drinks per week      Wt Readings from Last 1 Encounters:   22 68.5 kg (151 lb 1.6 oz)        Anesthesia Evaluation   Pt has had prior anesthetic.     No history of anesthetic complications       ROS/MED HX  ENT/Pulmonary:  - neg pulmonary ROS     Neurologic:  - neg neurologic ROS     Cardiovascular:  - neg cardiovascular ROS     METS/Exercise Tolerance: >4 METS    Hematologic:  - neg hematologic  ROS     Musculoskeletal:  - neg musculoskeletal ROS     GI/Hepatic:  - neg GI/hepatic ROS     Renal/Genitourinary:  - neg Renal ROS     Endo:  - neg endo ROS     Psychiatric/Substance Use: Comment: Gender dysphoria in adult    (+) psychiatric history depression     Infectious Disease:  - neg infectious disease ROS     Malignancy:  - neg malignancy ROS     Other:  - neg other ROS          Physical Exam    Airway  airway exam normal      Mallampati: I   TM distance: > 3 FB   Neck ROM: full   Mouth opening: > 3 cm    Respiratory Devices and Support         Dental  no notable dental history         Cardiovascular   cardiovascular exam normal       Rhythm and rate: regular and normal     Pulmonary   pulmonary exam normal        breath sounds clear to auscultation           OUTSIDE LABS:  CBC:   Lab Results    Component Value Date    WBC 4.5 02/28/2022    HGB 15.1 02/28/2022    HCT 45.7 02/28/2022     02/28/2022     BMP:   Lab Results   Component Value Date     02/28/2022    POTASSIUM 4.4 02/28/2022    CHLORIDE 107 02/28/2022    CO2 24 02/28/2022    BUN 12 02/28/2022    CR 0.87 02/28/2022    GLC 84 02/28/2022     COAGS: No results found for: PTT, INR, FIBR  POC:   Lab Results   Component Value Date    HCG Negative 03/25/2022     HEPATIC: No results found for: ALBUMIN, PROTTOTAL, ALT, AST, GGT, ALKPHOS, BILITOTAL, BILIDIRECT, RANDALL  OTHER:   Lab Results   Component Value Date    CORINE 9.3 02/28/2022       Anesthesia Plan    ASA Status:  1   NPO Status:  NPO Appropriate    Anesthesia Type: General.     - Airway: ETT   Induction: Intravenous, Propofol.   Maintenance: Balanced.        Consents    Anesthesia Plan(s) and associated risks, benefits, and realistic alternatives discussed. Questions answered and patient/representative(s) expressed understanding.    - Discussed:     - Discussed with:  Patient    Use of blood products discussed: No .     Postoperative Care    Pain management: Multi-modal analgesia, Oral pain medications.   PONV prophylaxis: Ondansetron (or other 5HT-3), Dexamethasone or Solumedrol     Comments:                Jesse De Los Santos DO

## 2022-03-25 NOTE — DISCHARGE INSTRUCTIONS
[facility]  Home Care Following Anesthesia  For 24 hours after surgery:  1. Get plenty of rest.  A responsible adult must stay with you for at least 24 hours after you leave the surgery center.  2. Do not drive or use heavy equipment.  If you have weakness or tingling, don't drive or use heavy equipment until this feeling goes away.   3. Do not drink alcohol.   4. Avoid strenuous or risky activities.  Ask for help when climbing stairs.  5. You may feel lightheaded.  IF so, sit for a few minutes before standing.  Have someone help you get up.   6. If you have nausea (feel sick to your stomach): Drink only clear liquids such as apple juice, ginger ale, broth or 7-Up.  Rest may also help.  Be sure to drink enough fluids.  Move to a regular diet as you feel able.   7. You may have a slight fever.  Call the doctor if your fever is over 100 F (37.7 C) (taken under the tongue) or lasts longer than 24 hours.  8. You may have a dry mouth, a sore throat, muscle aches or trouble sleeping. These should go away after 24 hours.  9. Do not make important or legal decisions.   10. It is recommended to avoid smoking.               Tips for taking pain medications  To get the best pain relief possible, remember these points:    Take pain medications as directed, before pain becomes severe.    Pain medication can upset your stomach: taking it with food may help.    Constipation is a common side effect of pain medication. Drink plenty of  fluids.    Eat foods high in fiber. Take a stool softener if recommended by your doctor or pharmacist.    Do not drink alcohol, drive or operate machinery while taking pain medications.    Ask about other ways to control pain, such as with heat, ice or relaxation.    Tylenol/Acetaminophen Consumption  To help encourage the safe use of acetaminophen, the makers of TYLENOL  have lowered the maximum daily dose for single-ingredient Extra Strength TYLENOL   (acetaminophen) products sold in the [place] from 8 pills per day (4,000 mg) to 6 pills per day (3,000 mg). The dosing interval has also changed from 2 pills every 4-6 hours to 2 pills every 6 hours.    If you feel your pain relief is insufficient, you may take Tylenol/Acetaminophen in addition to your narcotic pain medication.     Be careful not to exceed 3,000 mg of Tylenol/Acetaminophen in a 24 hour period from all sources.    If you are taking extra strength Tylenol/acetaminophen (500 mg), the maximum dose is 6 tablets in 24 hours.    If you are taking regular strength acetaminophen (325 mg), the maximum dose is 9 tablets in 24 hours.    You received a dose of 975 mg of Tylenol @ 6:45 AM . You may take another dose of Tylenol @ 12:45 PM.       Call a doctor for any of the followin. Signs of infection (fever, growing tenderness at the surgery site, a large amount of drainage or bleeding, severe pain, foul-smelling drainage, redness, swelling).  2. It has been over 8 to 10 hours since surgery and you are still not able to urinate (pass water).  3. Headache for over 24 hours.  4. Numbness, tingling or weakness the day after surgery (if you had spinal anesthesia).  5. Signs of Covid-19 infection (temperature over 100 degrees, shortness of breath, cough, loss of taste/smell, generalized body aches, persistent headache, chills, sore throat, nausea/vomiting/diarrhea)  Your doctor is:  Dr. Erin Perales, OB/GYN: [phone]                    Or dial [phone] and ask for the resident on call for:  OB/GYN  For emergency care, call the:  [facility]:  [phone] (TTY for hearing impaired: [phone])

## 2022-03-28 LAB
PATH REPORT.COMMENTS IMP SPEC: NORMAL
PATH REPORT.COMMENTS IMP SPEC: NORMAL
PATH REPORT.FINAL DX SPEC: NORMAL
PATH REPORT.GROSS SPEC: NORMAL
PATH REPORT.MICROSCOPIC SPEC OTHER STN: NORMAL
PATH REPORT.RELEVANT HX SPEC: NORMAL
PHOTO IMAGE: NORMAL

## 2022-03-28 PROCEDURE — 88302 TISSUE EXAM BY PATHOLOGIST: CPT | Mod: 26 | Performed by: PATHOLOGY

## 2022-04-03 ENCOUNTER — HEALTH MAINTENANCE LETTER (OUTPATIENT)
Age: 27
End: 2022-04-03

## 2022-04-13 ENCOUNTER — OFFICE VISIT (OUTPATIENT)
Dept: OBGYN | Facility: CLINIC | Age: 27
End: 2022-04-13
Attending: OBSTETRICS & GYNECOLOGY
Payer: COMMERCIAL

## 2022-04-13 VITALS
BODY MASS INDEX: 24.36 KG/M2 | HEIGHT: 67 IN | SYSTOLIC BLOOD PRESSURE: 137 MMHG | HEART RATE: 83 BPM | WEIGHT: 155.2 LBS | DIASTOLIC BLOOD PRESSURE: 78 MMHG

## 2022-04-13 DIAGNOSIS — Z98.890 POSTOPERATIVE STATE: Primary | ICD-10-CM

## 2022-04-13 PROCEDURE — G0463 HOSPITAL OUTPT CLINIC VISIT: HCPCS

## 2022-04-13 PROCEDURE — 99024 POSTOP FOLLOW-UP VISIT: CPT | Performed by: OBSTETRICS & GYNECOLOGY

## 2022-04-13 NOTE — LETTER
"4/13/2022       RE: Carol Escobar  [address]  [place] [address]     Dear Colleague,    Thank you for referring your patient, Carol Escobar, to the [facility] [place] at [facility]. Please see a copy of my visit note below.    [facility]  Gynecology Postoperative Visit    SUBJECTIVE    Jas Escobar is a 26 year old G0 who is here for a postoperative visit. They had a bilateral salpingectomy on 3/25. Since surgery, they note overall they are doing well. They did have a little urinary discomfort which is resolved. They have not given themselves testosterone lately because they take it subQ and didn't want to interfere with incisional healing.    Her LMP is: No LMP recorded.      REVIEW OF SYSTEMS  A 10 point review of systems including Constitutional, Eyes, Respiratory, Cardiovascular, Gastroenterology, Genitourinary, Integumentary, Musculoskeletal, and Psychiatric, were all negative, except for pertinent positives noted in the above HPI.    OBJECTIVE  /78 (BP Location: Left arm, Patient Position: Chair)   Pulse 83   Ht 1.702 m (5' 7\")   Wt 70.4 kg (155 lb 3.2 oz)   BMI 24.31 kg/m      General: Alert, without distress  HEENT: normocephalic, without obvious abnormality   Cardiovascular: regular rate and rhythm, no murmurs/rubs/gallops   Lungs: clear to auscultation bilaterally   Abdomen: soft, non-tender, non-distended, incisions healing well without erythema/swelling/drainage; RLQ incision with small amt of extruded suture material which I removed without incident   Extremities: normal    SURGICAL PATHOLOGY:  Fallopian tubes, bilateral salpingectomy:  - Unremarkable cross-sections of bilateral fallopian tubes and fimbriated ends  - Negative for atypia malignancy    ASSESSMENT  Jas is a 26 year old G0 here for a postop visit. They are doing well.     PLAN  -reviewed benign/normal " surgical pathology  -discussed no further need for activity restrictions. OK to use testosterone.     Followup at [facility] at any time if concerns.    Wendy Hudson MD, MSCI    [facility]/OBGYN  Date of Service: 4/13/22

## 2022-04-13 NOTE — PROGRESS NOTES
"[facility]  Gynecology Postoperative Visit    SUBJECTIVE    Jas Escobar is a 26 year old G0 who is here for a postoperative visit. They had a bilateral salpingectomy on 3/25. Since surgery, they note overall they are doing well. They did have a little urinary discomfort which is resolved. They have not given themselves testosterone lately because they take it subQ and didn't want to interfere with incisional healing.    Her LMP is: No LMP recorded.      REVIEW OF SYSTEMS  A 10 point review of systems including Constitutional, Eyes, Respiratory, Cardiovascular, Gastroenterology, Genitourinary, Integumentary, Musculoskeletal, and Psychiatric, were all negative, except for pertinent positives noted in the above HPI.    OBJECTIVE  /78 (BP Location: Left arm, Patient Position: Chair)   Pulse 83   Ht 1.702 m (5' 7\")   Wt 70.4 kg (155 lb 3.2 oz)   BMI 24.31 kg/m      General: Alert, without distress  HEENT: normocephalic, without obvious abnormality   Cardiovascular: regular rate and rhythm, no murmurs/rubs/gallops   Lungs: clear to auscultation bilaterally   Abdomen: soft, non-tender, non-distended, incisions healing well without erythema/swelling/drainage; RLQ incision with small amt of extruded suture material which I removed without incident   Extremities: normal    SURGICAL PATHOLOGY:  Fallopian tubes, bilateral salpingectomy:  - Unremarkable cross-sections of bilateral fallopian tubes and fimbriated ends  - Negative for atypia malignancy    ASSESSMENT  Jas is a 26 year old G0 here for a postop visit. They are doing well.     PLAN  -reviewed benign/normal surgical pathology  -discussed no further need for activity restrictions. OK to use testosterone.     Followup at [facility] at any time if concerns.    Wendy Hudson MD, MSCI    [facility]/OBGYN  Date of Service: 4/13/22    "

## 2022-05-10 ENCOUNTER — OFFICE VISIT (OUTPATIENT)
Dept: OBGYN | Facility: CLINIC | Age: 27
End: 2022-05-10
Attending: OBSTETRICS & GYNECOLOGY
Payer: COMMERCIAL

## 2022-05-10 VITALS
DIASTOLIC BLOOD PRESSURE: 77 MMHG | HEIGHT: 67 IN | WEIGHT: 156.8 LBS | BODY MASS INDEX: 24.61 KG/M2 | HEART RATE: 93 BPM | SYSTOLIC BLOOD PRESSURE: 120 MMHG

## 2022-05-10 DIAGNOSIS — R10.31 RLQ ABDOMINAL PAIN: Primary | ICD-10-CM

## 2022-05-10 PROCEDURE — G0463 HOSPITAL OUTPT CLINIC VISIT: HCPCS

## 2022-05-10 PROCEDURE — 99024 POSTOP FOLLOW-UP VISIT: CPT | Performed by: OBSTETRICS & GYNECOLOGY

## 2022-05-10 NOTE — LETTER
"5/10/2022       RE: Carol Escobar  [address]  [place] [address]     Dear Colleague,    Thank you for referring your patient, Carol Escobar, to the [facility] [place] at [facility]. Please see a copy of my visit note below.    Pain in RLQ for about 2 weeks. Maybe started after IC around that time.     Notes some irritation of suture in RLQ port. Removed stitch tail yesterday, seems to be improving since.       /77 (BP Location: Left arm, Patient Position: Chair)   Pulse 93   Ht 1.702 m (5' 7\")   Wt 71.1 kg (156 lb 12.8 oz)   BMI 24.56 kg/m     Incisions well healed.  RLQ site slightly behind in healing- likely related to suture irritation.   Spec exam w/ normal cervix and vagina  Clitoromegaly noted  Bimanual exam w/o masses. Tender in RLQ      A/p: RLQ pain x 2 wks.     Uls to eval for possible cyst.    Suspect post op pain that will resolve with some time.  F/u prn worsening symptoms.    Cindy Mcclure MD, FACOG  (she/her/hers)    Department of Ob/Gyn/Women's Health  [employer] [facility] [employer]  [place] [facility]  [address] [place] [address]  [email or web address]  p. [phone]  f. [phone]  "

## 2022-05-10 NOTE — PROGRESS NOTES
"Pain in RLQ for about 2 weeks. Maybe started after IC around that time.     Notes some irritation of suture in RLQ port. Removed stitch tail yesterday, seems to be improving since.       /77 (BP Location: Left arm, Patient Position: Chair)   Pulse 93   Ht 1.702 m (5' 7\")   Wt 71.1 kg (156 lb 12.8 oz)   BMI 24.56 kg/m     Incisions well healed.  RLQ site slightly behind in healing- likely related to suture irritation.   Spec exam w/ normal cervix and vagina  Clitoromegaly noted  Bimanual exam w/o masses. Tender in RLQ      A/p: RLQ pain x 2 wks.     Uls to eval for possible cyst.    Suspect post op pain that will resolve with some time.  F/u prn worsening symptoms.    Cindy Mcclure MD, FACOG  (she/her/hers)    Department of Ob/Gyn/Women's Health  [employer]  [place] [facility]  [address] [place] [address]  [email or web address]  p. [phone]  f. [phone]    "

## 2022-05-23 ENCOUNTER — ANCILLARY PROCEDURE (OUTPATIENT)
Dept: ULTRASOUND IMAGING | Facility: CLINIC | Age: 27
End: 2022-05-23
Attending: OBSTETRICS & GYNECOLOGY
Payer: COMMERCIAL

## 2022-05-23 DIAGNOSIS — R10.31 RLQ ABDOMINAL PAIN: ICD-10-CM

## 2022-05-23 PROCEDURE — 76830 TRANSVAGINAL US NON-OB: CPT

## 2022-05-23 PROCEDURE — 76830 TRANSVAGINAL US NON-OB: CPT | Mod: 26 | Performed by: OBSTETRICS & GYNECOLOGY

## 2022-05-30 ENCOUNTER — E-VISIT (OUTPATIENT)
Dept: FAMILY MEDICINE | Facility: CLINIC | Age: 27
End: 2022-05-30
Payer: COMMERCIAL

## 2022-05-30 DIAGNOSIS — F41.9 ANXIETY: Primary | ICD-10-CM

## 2022-05-30 PROCEDURE — 99422 OL DIG E/M SVC 11-20 MIN: CPT | Performed by: FAMILY MEDICINE

## 2022-05-30 ASSESSMENT — ANXIETY QUESTIONNAIRES
GAD7 TOTAL SCORE: 11
1. FEELING NERVOUS, ANXIOUS, OR ON EDGE: MORE THAN HALF THE DAYS
GAD7 TOTAL SCORE: 11
4. TROUBLE RELAXING: MORE THAN HALF THE DAYS
7. FEELING AFRAID AS IF SOMETHING AWFUL MIGHT HAPPEN: MORE THAN HALF THE DAYS
6. BECOMING EASILY ANNOYED OR IRRITABLE: SEVERAL DAYS
3. WORRYING TOO MUCH ABOUT DIFFERENT THINGS: MORE THAN HALF THE DAYS
8. IF YOU CHECKED OFF ANY PROBLEMS, HOW DIFFICULT HAVE THESE MADE IT FOR YOU TO DO YOUR WORK, TAKE CARE OF THINGS AT HOME, OR GET ALONG WITH OTHER PEOPLE?: VERY DIFFICULT
GAD7 TOTAL SCORE: 11
2. NOT BEING ABLE TO STOP OR CONTROL WORRYING: MORE THAN HALF THE DAYS
7. FEELING AFRAID AS IF SOMETHING AWFUL MIGHT HAPPEN: MORE THAN HALF THE DAYS
5. BEING SO RESTLESS THAT IT IS HARD TO SIT STILL: NOT AT ALL

## 2022-05-30 ASSESSMENT — PATIENT HEALTH QUESTIONNAIRE - PHQ9
SUM OF ALL RESPONSES TO PHQ QUESTIONS 1-9: 10
10. IF YOU CHECKED OFF ANY PROBLEMS, HOW DIFFICULT HAVE THESE PROBLEMS MADE IT FOR YOU TO DO YOUR WORK, TAKE CARE OF THINGS AT HOME, OR GET ALONG WITH OTHER PEOPLE: SOMEWHAT DIFFICULT
SUM OF ALL RESPONSES TO PHQ QUESTIONS 1-9: 10

## 2022-05-31 ASSESSMENT — ANXIETY QUESTIONNAIRES: GAD7 TOTAL SCORE: 11

## 2022-05-31 ASSESSMENT — PATIENT HEALTH QUESTIONNAIRE - PHQ9: SUM OF ALL RESPONSES TO PHQ QUESTIONS 1-9: 10

## 2022-06-01 RX ORDER — ESCITALOPRAM OXALATE 10 MG/1
10 TABLET ORAL DAILY
Qty: 90 TABLET | Refills: 0 | Status: SHIPPED | OUTPATIENT
Start: 2022-06-01 | End: 2023-05-10

## 2022-06-01 RX ORDER — CLONAZEPAM 0.5 MG/1
0.5 TABLET ORAL 2 TIMES DAILY PRN
Qty: 6 TABLET | Refills: 0 | Status: SHIPPED | OUTPATIENT
Start: 2022-06-01 | End: 2024-05-17

## 2022-07-15 ENCOUNTER — TELEPHONE (OUTPATIENT)
Dept: OBGYN | Facility: CLINIC | Age: 27
End: 2022-07-15

## 2022-07-15 DIAGNOSIS — R10.31 RLQ ABDOMINAL PAIN: Primary | ICD-10-CM

## 2022-07-15 NOTE — TELEPHONE ENCOUNTER
[facility] Call Center    Phone Message    May a detailed message be left on voicemail: yes     Reason for Call: Order(s): Other:   Reason for requested: US  Date needed: asap  Provider name: Dr. Mcclure.       Patient wanting to do a repeat US due to some pelvic pain. Please reach out to patient. Thank you!    Action Taken: Message routed to:  Other: S    Travel Screening: Not Applicable

## 2022-07-19 NOTE — TELEPHONE ENCOUNTER
Called patient to discuss pain and repeat US, no answer. Left voicemail.   Per last note from  in 5/23 she recommended to follow up ultrasound in 6-8 weeks to see if cyst resolved. Ordered per MD.

## 2022-08-10 ENCOUNTER — OFFICE VISIT (OUTPATIENT)
Dept: FAMILY MEDICINE | Facility: CLINIC | Age: 27
End: 2022-08-10
Payer: COMMERCIAL

## 2022-08-10 VITALS
OXYGEN SATURATION: 97 % | RESPIRATION RATE: 16 BRPM | HEART RATE: 90 BPM | WEIGHT: 161 LBS | DIASTOLIC BLOOD PRESSURE: 73 MMHG | BODY MASS INDEX: 25.27 KG/M2 | TEMPERATURE: 98.2 F | SYSTOLIC BLOOD PRESSURE: 130 MMHG | HEIGHT: 67 IN

## 2022-08-10 DIAGNOSIS — R53.83 FATIGUE, UNSPECIFIED TYPE: Primary | ICD-10-CM

## 2022-08-10 DIAGNOSIS — F41.1 GAD (GENERALIZED ANXIETY DISORDER): ICD-10-CM

## 2022-08-10 DIAGNOSIS — E55.9 VITAMIN D DEFICIENCY: ICD-10-CM

## 2022-08-10 DIAGNOSIS — R10.31 ABDOMINAL PAIN, RIGHT LOWER QUADRANT: ICD-10-CM

## 2022-08-10 LAB — VIT B12 SERPL-MCNC: 301 PG/ML (ref 232–1245)

## 2022-08-10 PROCEDURE — 82306 VITAMIN D 25 HYDROXY: CPT | Performed by: INTERNAL MEDICINE

## 2022-08-10 PROCEDURE — 84443 ASSAY THYROID STIM HORMONE: CPT | Performed by: INTERNAL MEDICINE

## 2022-08-10 PROCEDURE — 82607 VITAMIN B-12: CPT | Performed by: INTERNAL MEDICINE

## 2022-08-10 PROCEDURE — 99214 OFFICE O/P EST MOD 30 MIN: CPT | Performed by: INTERNAL MEDICINE

## 2022-08-10 PROCEDURE — 36415 COLL VENOUS BLD VENIPUNCTURE: CPT | Performed by: INTERNAL MEDICINE

## 2022-08-10 NOTE — PROGRESS NOTES
Assessment & Plan     Fatigue, unspecified type  - TSH with free T4 reflex; Future  - Vitamin B12; Future    Vitamin D deficiency  - Vitamin D Deficiency; Future    Abdominal pain, right lower quadrant  Restricting lactose in diet, avoiding food that causes bloating, making a food diary,     JAMES (generalized anxiety disorder)  Stable. Continue lexapro.     Nicotine/Tobacco Cessation:  Jas Escobar reports that Jas Escobar has been smoking. Jas Escobar has been smoking about 0.00 packs per day for the past 0.00 years. Jas Escobar has never used smokeless tobacco.  Nicotine/Tobacco Cessation Plan:   Information offered: Patient not interested at this time    See Patient Instructions    Return in about 3 months (around 11/10/2022) for Follow up, with me.    TIERA WALL MD  [facility]    Erwin Mark is a 26 year old, presenting for the following health issues:  No chief complaint on file.      HPI     Jas is a very pleasant 26 year old person who presented to the clinic with multiple concerns.   Any kind of physical activity causes pain in body.   Abdominal symptoms include: she has IBS - symptoms are bloating abdominal pain, diarrhea and constipation. She goes to GI clinic at [facility].   Abdominal pain is colicky, mostly on right lower quadrant.   She has hx of b/l salpingectomy, ovaries intact.      Review of Systems       Objective    There were no vitals taken for this visit.  There is no height or weight on file to calculate BMI.  Physical Exam               .  ..

## 2022-08-10 NOTE — PATIENT INSTRUCTIONS
DIETARY RESTRICTION:  Stop all dairy for 8 weeks     Probiotics     Food diary for correlating abdominal symptoms

## 2022-08-11 LAB
DEPRECATED CALCIDIOL+CALCIFEROL SERPL-MC: 25 UG/L (ref 20–75)
TSH SERPL DL<=0.005 MIU/L-ACNC: 0.64 MU/L (ref 0.4–4)

## 2022-10-03 ENCOUNTER — HEALTH MAINTENANCE LETTER (OUTPATIENT)
Age: 27
End: 2022-10-03

## 2023-05-10 ENCOUNTER — OFFICE VISIT (OUTPATIENT)
Dept: FAMILY MEDICINE | Facility: CLINIC | Age: 28
End: 2023-05-10
Payer: COMMERCIAL

## 2023-05-10 VITALS
WEIGHT: 161 LBS | HEIGHT: 67 IN | DIASTOLIC BLOOD PRESSURE: 88 MMHG | BODY MASS INDEX: 25.27 KG/M2 | TEMPERATURE: 99.1 F | SYSTOLIC BLOOD PRESSURE: 129 MMHG | OXYGEN SATURATION: 96 % | RESPIRATION RATE: 16 BRPM | HEART RATE: 86 BPM

## 2023-05-10 DIAGNOSIS — R53.83 OTHER FATIGUE: ICD-10-CM

## 2023-05-10 DIAGNOSIS — F90.0 ATTENTION DEFICIT HYPERACTIVITY DISORDER (ADHD), PREDOMINANTLY INATTENTIVE TYPE: Primary | ICD-10-CM

## 2023-05-10 DIAGNOSIS — F41.9 ANXIETY: ICD-10-CM

## 2023-05-10 LAB
ERYTHROCYTE [DISTWIDTH] IN BLOOD BY AUTOMATED COUNT: 12.7 % (ref 10–15)
HCT VFR BLD AUTO: 42.6 % (ref 35–53)
HGB BLD-MCNC: 13.9 G/DL (ref 11.7–17.7)
MCH RBC QN AUTO: 28.3 PG (ref 26.5–33)
MCHC RBC AUTO-ENTMCNC: 32.6 G/DL (ref 31.5–36.5)
MCV RBC AUTO: 87 FL (ref 78–100)
PLATELET # BLD AUTO: 294 10E3/UL (ref 150–450)
RBC # BLD AUTO: 4.91 10E6/UL (ref 3.8–5.9)
WBC # BLD AUTO: 6.5 10E3/UL (ref 4–11)

## 2023-05-10 PROCEDURE — 82728 ASSAY OF FERRITIN: CPT | Performed by: FAMILY MEDICINE

## 2023-05-10 PROCEDURE — 99214 OFFICE O/P EST MOD 30 MIN: CPT | Performed by: FAMILY MEDICINE

## 2023-05-10 PROCEDURE — 36415 COLL VENOUS BLD VENIPUNCTURE: CPT | Performed by: FAMILY MEDICINE

## 2023-05-10 PROCEDURE — 84439 ASSAY OF FREE THYROXINE: CPT | Performed by: FAMILY MEDICINE

## 2023-05-10 PROCEDURE — 96127 BRIEF EMOTIONAL/BEHAV ASSMT: CPT | Performed by: FAMILY MEDICINE

## 2023-05-10 PROCEDURE — 84443 ASSAY THYROID STIM HORMONE: CPT | Performed by: FAMILY MEDICINE

## 2023-05-10 PROCEDURE — 86376 MICROSOMAL ANTIBODY EACH: CPT | Performed by: FAMILY MEDICINE

## 2023-05-10 PROCEDURE — 85027 COMPLETE CBC AUTOMATED: CPT | Performed by: FAMILY MEDICINE

## 2023-05-10 RX ORDER — DEXTROAMPHETAMINE SACCHARATE, AMPHETAMINE ASPARTATE MONOHYDRATE, DEXTROAMPHETAMINE SULFATE AND AMPHETAMINE SULFATE 2.5; 2.5; 2.5; 2.5 MG/1; MG/1; MG/1; MG/1
10 CAPSULE, EXTENDED RELEASE ORAL DAILY
Qty: 30 CAPSULE | Refills: 0 | Status: SHIPPED | OUTPATIENT
Start: 2023-06-10 | End: 2023-06-22

## 2023-05-10 RX ORDER — DEXTROAMPHETAMINE SACCHARATE, AMPHETAMINE ASPARTATE MONOHYDRATE, DEXTROAMPHETAMINE SULFATE AND AMPHETAMINE SULFATE 2.5; 2.5; 2.5; 2.5 MG/1; MG/1; MG/1; MG/1
10 CAPSULE, EXTENDED RELEASE ORAL DAILY
Qty: 30 CAPSULE | Refills: 0 | Status: SHIPPED | OUTPATIENT
Start: 2023-07-11 | End: 2023-08-10

## 2023-05-10 RX ORDER — ESCITALOPRAM OXALATE 10 MG/1
10 TABLET ORAL DAILY
Qty: 30 TABLET | Refills: 3 | Status: SHIPPED | OUTPATIENT
Start: 2023-05-10 | End: 2023-11-29

## 2023-05-10 RX ORDER — DEXTROAMPHETAMINE SACCHARATE, AMPHETAMINE ASPARTATE MONOHYDRATE, DEXTROAMPHETAMINE SULFATE AND AMPHETAMINE SULFATE 2.5; 2.5; 2.5; 2.5 MG/1; MG/1; MG/1; MG/1
10 CAPSULE, EXTENDED RELEASE ORAL DAILY
Qty: 30 CAPSULE | Refills: 0 | Status: SHIPPED | OUTPATIENT
Start: 2023-05-10 | End: 2023-06-09

## 2023-05-10 ASSESSMENT — PAIN SCALES - GENERAL: PAINLEVEL: MODERATE PAIN (4)

## 2023-05-10 ASSESSMENT — PATIENT HEALTH QUESTIONNAIRE - PHQ9: 5. POOR APPETITE OR OVEREATING: MORE THAN HALF THE DAYS

## 2023-05-10 ASSESSMENT — ANXIETY QUESTIONNAIRES
IF YOU CHECKED OFF ANY PROBLEMS ON THIS QUESTIONNAIRE, HOW DIFFICULT HAVE THESE PROBLEMS MADE IT FOR YOU TO DO YOUR WORK, TAKE CARE OF THINGS AT HOME, OR GET ALONG WITH OTHER PEOPLE: SOMEWHAT DIFFICULT
7. FEELING AFRAID AS IF SOMETHING AWFUL MIGHT HAPPEN: SEVERAL DAYS
1. FEELING NERVOUS, ANXIOUS, OR ON EDGE: MORE THAN HALF THE DAYS
6. BECOMING EASILY ANNOYED OR IRRITABLE: NOT AT ALL
GAD7 TOTAL SCORE: 8
5. BEING SO RESTLESS THAT IT IS HARD TO SIT STILL: NOT AT ALL
GAD7 TOTAL SCORE: 8
3. WORRYING TOO MUCH ABOUT DIFFERENT THINGS: MORE THAN HALF THE DAYS
2. NOT BEING ABLE TO STOP OR CONTROL WORRYING: SEVERAL DAYS

## 2023-05-10 NOTE — PATIENT INSTRUCTIONS
Attention deficit hyperactivity disorder (ADHD), predominantly inattentive type  Plan: follow up in 3 month on pohone or video follow up 6 months OV  Random urine test for addiontinal drug screen  Or see primary care physicain     - amphetamine-dextroamphetamine (ADDERALL XR) 10 MG 24 hr capsule; Take 1 capsule (10 mg) by mouth daily for 30 days  - amphetamine-dextroamphetamine (ADDERALL XR) 10 MG 24 hr capsule; Take 1 capsule (10 mg) by mouth daily for 30 days  - amphetamine-dextroamphetamine (ADDERALL XR) 10 MG 24 hr capsule; Take 1 capsule (10 mg) by mouth daily for 30 days    Other fatigue  Rule out thyroid and or anemia  monitor symptoms   - TSH with free T4 reflex; Future  - Thyroid peroxidase antibody; Future  - T4, free; Future  - Ferritin; Future  - CBC with platelets; Future    Anxiety  Continue therapy (1yr)  Physical activity of choice  Add lexaprol  Follow up on phone ok in 1 month   - EMOTIONAL / BEHAVIORAL ASSESSMENT  - escitalopram (LEXAPRO) 10 MG tablet; Take 1 tablet (10 mg) by mouth daily

## 2023-05-10 NOTE — PROGRESS NOTES
"  Assessment & Plan     Attention deficit hyperactivity disorder (ADHD), predominantly inattentive type  Plan: follow up in 3 month on pohone or video follow up 6 months OV  Random urine test for addiontinal drug screen  Or see primary care physicain /[facility]    - amphetamine-dextroamphetamine (ADDERALL XR) 10 MG 24 hr capsule; Take 1 capsule (10 mg) by mouth daily for 30 days  - amphetamine-dextroamphetamine (ADDERALL XR) 10 MG 24 hr capsule; Take 1 capsule (10 mg) by mouth daily for 30 days  - amphetamine-dextroamphetamine (ADDERALL XR) 10 MG 24 hr capsule; Take 1 capsule (10 mg) by mouth daily for 30 days  Potential medication side effects were discussed with the patient; let me know if any occur.    Other fatigue  Rule out thyroid and or anemia  monitor symptoms   - TSH with free T4 reflex; Future  - Thyroid peroxidase antibody; Future  - T4, free; Future  - Ferritin; Future  - CBC with platelets; Future    Anxiety  Continue therapy (1yr)  Physical activity of choice  Add lexaprol  Follow up on phone ok in 1 month   - EMOTIONAL / BEHAVIORAL ASSESSMENT  - escitalopram (LEXAPRO) 10 MG tablet; Take 1 tablet (10 mg) by mouth daily             BMI:   Estimated body mass index is 25.03 kg/m  as calculated from the following:    Height as of this encounter: 1.708 m (5' 7.25\").    Weight as of this encounter: 73 kg (161 lb).           Evangelina Grayson MD  [facility]    Erwin Mark is a 27 year old, presenting for the following health issues:  A.D.H.D and Thyroid Problem        5/10/2023     3:27 PM   Additional Questions   Roomed by Mary Anne RIVER    History of Present Illness       Reason for visit:  ADHD and various hypothyroid-like symptoms    Jas Escobar eats 2-3 servings of fruits and vegetables daily.Jas Escobar consumes 1 sweetened beverage(s) daily.Jas Escobar exercises with enough effort to increase Jas Escobar's heart rate 10 to 19 minutes per day.  Jas " Tereza Escobar exercises with enough effort to increase Harsha Escobar's heart rate 3 or less days per week. Harsha Escobar is missing 1 dose(s) of medications per week.  Harsha Escobar is not taking prescribed medications regularly due to remembering to take.     ADHD Follow-Up    Date of last ADHD office visit: follow up  Status since last visit: Stable- but affecting more since starting school again.   Taking controlled (daily) medications as prescribed: Not on medications currently                       Parent/Patient Concerns with Medications: would like to get back on medication, tried vyvanse, adderall, ritalin -tried in 2016  vyvanse caused nausea   Psych wa too eager to switch too fast  Switched to short acting and felt crashing  [place] [facility] [employer] [facility]   preferred name harsha  Diagnosed with attention deficit hyperactivity disorder -eval completed august 2nd 2016 @ [facility]       Has been NOT taking gabapentin as it makes them feel worse , as it wears off  Stopped lexapro since about spring 22. It helped for a long time and so stopped as felt well on it and then without it  heightened anxiety      Resumed school- for court recording * wants medications for improved focus    From [place]  Moved to [place]- for abroad  Stayed during covid  Now moved to [facility]  June 2021with BF    Has primary care sandra vargas  Wanted to get in to get medications faster- & uptwn convinenet locatun      [facility]-has been [facility] it for 6 yrs.  Previus for 2 yrs [facility] was precring  Feels better with testosterone   Feels terrible without.        Hypothyroidism Follow-up      Since last visit, patient describes the following symptoms: hands and feet get really cold, body aches, fatigue, pins and needle sensation in hands and feet. Would like to explore testing. Sister has hypothyroidism     Sister has hypothyroidism.  Has been fatigue for past few yrs  [facility]       Review of Systems  "  Constitutional, HEENT, cardiovascular, pulmonary, GI, , musculoskeletal, neuro, skin, endocrine and psych systems are negative, except as otherwise noted.      Objective    /88 (BP Location: Left arm, Patient Position: Sitting, Cuff Size: Adult Regular)   Pulse 86   Temp 99.1  F (37.3  C) (Temporal)   Resp 16   Ht 1.708 m (5' 7.25\")   Wt 73 kg (161 lb)   LMP 04/27/2023 (Approximate)   SpO2 96%   BMI 25.03 kg/m    Body mass index is 25.03 kg/m .  Physical Exam   GENERAL: healthy, alert and no distress  NECK: no adenopathy, no asymmetry, masses, or scars and thyroid normal to palpation  RESP: lungs clear to auscultation - no rales, rhonchi or wheezes  CV: regular rate and rhythm, normal S1 S2, no S3 or S4, no murmur, click or rub, no peripheral edema and peripheral pulses strong  ABDOMEN: soft, nontender, no hepatosplenomegaly, no masses and bowel sounds normal  PSYCH: mentation appears normal, affect normal/bright    No results found for this or any previous visit (from the past 24 hour(s)).                "

## 2023-05-11 PROBLEM — F90.0 ATTENTION DEFICIT HYPERACTIVITY DISORDER (ADHD), PREDOMINANTLY INATTENTIVE TYPE: Status: ACTIVE | Noted: 2023-05-11

## 2023-05-11 LAB
FERRITIN SERPL-MCNC: 71 NG/ML (ref 6–409)
T4 FREE SERPL-MCNC: 1.23 NG/DL (ref 0.9–1.7)
THYROPEROXIDASE AB SERPL-ACNC: 34 IU/ML
TSH SERPL DL<=0.005 MIU/L-ACNC: 0.86 UIU/ML (ref 0.3–4.2)

## 2023-05-20 ENCOUNTER — HEALTH MAINTENANCE LETTER (OUTPATIENT)
Age: 28
End: 2023-05-20

## 2023-05-26 ENCOUNTER — OFFICE VISIT (OUTPATIENT)
Dept: MIDWIFE SERVICES | Facility: CLINIC | Age: 28
End: 2023-05-26
Payer: COMMERCIAL

## 2023-05-26 VITALS
OXYGEN SATURATION: 100 % | BODY MASS INDEX: 25.34 KG/M2 | HEART RATE: 76 BPM | DIASTOLIC BLOOD PRESSURE: 72 MMHG | WEIGHT: 163 LBS | SYSTOLIC BLOOD PRESSURE: 129 MMHG

## 2023-05-26 DIAGNOSIS — N83.201 CYST OF RIGHT OVARY: ICD-10-CM

## 2023-05-26 DIAGNOSIS — L72.3 SEBACEOUS CYST: Primary | ICD-10-CM

## 2023-05-26 PROCEDURE — 99212 OFFICE O/P EST SF 10 MIN: CPT | Performed by: ADVANCED PRACTICE MIDWIFE

## 2023-05-26 NOTE — PROGRESS NOTES
"Jas is a 27 year old who is here for of small lump on their right labia majora that they noticed 5-6 wks ago. The lump is deep under the skin and is tender to palpation. It is not red or hot and has been approximately the same size since they noticed it. Sometimes it feels \"raw\" and it might become more painful as the day goes on.     In addition they had a period of no insurance and were supposed to have a repeat pelvic ultrasound for a small complex right ovarian cyst. They would like to f/u now because they have insurance.     O: /72   Pulse 76   Wt 73.9 kg (163 lb)   LMP 04/27/2023 (Approximate)   SpO2 100%   BMI 25.34 kg/m    General: well appearing, in NAD  Cardiac: well perfused  Respiratory: non-labored breathing on room air   Psych: alert and oriented     PELVIC EXAM:  Vulva: BUS WNL, no lesions noted, small soft lump palpated on anterior and interior aspect of right labial majora. It is soft, and has no signs of infection. Less then pea-sized .  Vagina: Discharge normal and physiologic, no lesions  Rectal exam: deferred       (L72.3) Sebaceous cyst  (primary encounter diagnosis)  Comment: Do not recommend excision  Plan: Recommend hot compresses and epsom salt soaks.     (N83.201) Cyst of right ovary  Comment:   Plan: US Transvaginal Pelvic Non-OB              Nadia Coto, KESHAWNM, APRN CNM    "

## 2023-06-06 ENCOUNTER — ANCILLARY PROCEDURE (OUTPATIENT)
Dept: ULTRASOUND IMAGING | Facility: CLINIC | Age: 28
End: 2023-06-06
Attending: ADVANCED PRACTICE MIDWIFE
Payer: COMMERCIAL

## 2023-06-06 DIAGNOSIS — N83.201 CYST OF RIGHT OVARY: ICD-10-CM

## 2023-06-06 PROCEDURE — 76830 TRANSVAGINAL US NON-OB: CPT | Performed by: OBSTETRICS & GYNECOLOGY

## 2023-06-06 NOTE — RESULT ENCOUNTER NOTE
Dear Jas,    Your test results are attached below. You no longer have a cyst on  your ovary. It has resolved. If you have any questions, please contact me via [facility] or you can call our office at [phone].    Nadia Quinteros, CLAY, APRN CLAY, KESHAWNM

## 2023-06-16 ENCOUNTER — OFFICE VISIT (OUTPATIENT)
Dept: FAMILY MEDICINE | Facility: CLINIC | Age: 28
End: 2023-06-16
Payer: COMMERCIAL

## 2023-06-16 VITALS
TEMPERATURE: 99 F | RESPIRATION RATE: 19 BRPM | OXYGEN SATURATION: 98 % | WEIGHT: 163 LBS | HEART RATE: 91 BPM | BODY MASS INDEX: 25.58 KG/M2 | HEIGHT: 67 IN | SYSTOLIC BLOOD PRESSURE: 114 MMHG | DIASTOLIC BLOOD PRESSURE: 79 MMHG

## 2023-06-16 DIAGNOSIS — I73.00 RAYNAUD'S DISEASE WITHOUT GANGRENE: Primary | ICD-10-CM

## 2023-06-16 PROCEDURE — 99214 OFFICE O/P EST MOD 30 MIN: CPT | Performed by: FAMILY MEDICINE

## 2023-06-16 ASSESSMENT — PAIN SCALES - GENERAL: PAINLEVEL: MILD PAIN (2)

## 2023-06-16 NOTE — PROGRESS NOTES
"Assessment/Plan.    Raynaud syndrome. Likely primary, though pt does have some symptoms suggestive of autoimmune (joint swelling) and connective tissue disease (skin fragility, though Beighton score today is only 2).  -Will refer to Rheumatology  -Jas does not feel that symptoms are bothersome enough to warrant trial of calcium channel blocker    Orders Placed This Encounter   Procedures     Adult Rheumatology  Referral     ----  Chief complaint: Numbness    Social History     Social History Narrative    Updated 6/16/2023: Grew up in [place], [place].  Lives with boyfriend.  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     Cold intolerance of toes and fingers.  Patient started to notice this about a year ago.  Patient moved to [place] around that time.  Toes and/her fingers will turn white and feel numb.  After rewarming, they will be painful.  No associated blistering of the skin.    The patient denies a personal history of autoimmune illness.  With regard to family history of autoimmune illness, patient mentions a sister with hypothyroidism.    No recent dysphagia.  No recent rashes, though patient does report her skin is very sensitive to even minor trauma.  Patient does not feel that patient is hyperflexible.  Patient periodically experiences pain, stiffness and swelling of joints in the hands, especially the left first CMC joint.    Patient met with a rheumatologist about 4 years ago, prior to the onset of the symptoms.  Patient reports that recent thyroid testing was normal.    Patient reports that symptoms preceded the start of prescription stimulant use.    Exam  /79 (BP Location: Left arm, Patient Position: Sitting, Cuff Size: Adult Regular)   Pulse 91   Temp 99  F (37.2  C) (Temporal)   Resp 19   Ht 1.71 m (5' 7.32\")   Wt 73.9 kg (163 lb)   LMP 05/28/2023 (Approximate)   SpO2 98%   BMI 25.29 kg/m    Patient's last menstrual period was 05/28/2023 (approximate). "     Fingers appear warm and well-perfused.  No appreciable swelling of joints of the hand.  Toes appear warm and well-perfused.  Heart with regular rate and rhythm, no murmurs.    Patient showed me photos of pale-appearing toes and palm.

## 2023-06-19 ENCOUNTER — MYC REFILL (OUTPATIENT)
Dept: FAMILY MEDICINE | Facility: CLINIC | Age: 28
End: 2023-06-19
Payer: COMMERCIAL

## 2023-06-19 DIAGNOSIS — F90.0 ATTENTION DEFICIT HYPERACTIVITY DISORDER (ADHD), PREDOMINANTLY INATTENTIVE TYPE: ICD-10-CM

## 2023-06-20 PROBLEM — R10.31 ABDOMINAL PAIN, RIGHT LOWER QUADRANT: Status: RESOLVED | Noted: 2022-08-10 | Resolved: 2023-06-20

## 2023-06-20 PROBLEM — I73.00 RAYNAUD'S DISEASE WITHOUT GANGRENE: Status: ACTIVE | Noted: 2023-06-20

## 2023-06-20 PROBLEM — Z30.2 ENCOUNTER FOR STERILIZATION: Status: RESOLVED | Noted: 2022-01-21 | Resolved: 2023-06-20

## 2023-06-20 PROBLEM — I95.1 CHRONIC ORTHOSTATIC HYPOTENSION: Status: ACTIVE | Noted: 2023-06-20

## 2023-06-20 PROBLEM — F17.210 CIGARETTE SMOKER: Status: ACTIVE | Noted: 2023-06-20

## 2023-06-20 PROBLEM — F64.9 GENDER INCONGRUENCE: Status: ACTIVE | Noted: 2023-06-20

## 2023-06-20 RX ORDER — DEXTROAMPHETAMINE SACCHARATE, AMPHETAMINE ASPARTATE MONOHYDRATE, DEXTROAMPHETAMINE SULFATE AND AMPHETAMINE SULFATE 2.5; 2.5; 2.5; 2.5 MG/1; MG/1; MG/1; MG/1
10 CAPSULE, EXTENDED RELEASE ORAL DAILY
Qty: 30 CAPSULE | Refills: 0 | OUTPATIENT
Start: 2023-06-20

## 2023-06-22 ENCOUNTER — MYC REFILL (OUTPATIENT)
Dept: FAMILY MEDICINE | Facility: CLINIC | Age: 28
End: 2023-06-22
Payer: COMMERCIAL

## 2023-06-22 ENCOUNTER — MYC MEDICAL ADVICE (OUTPATIENT)
Dept: FAMILY MEDICINE | Facility: CLINIC | Age: 28
End: 2023-06-22
Payer: COMMERCIAL

## 2023-06-22 DIAGNOSIS — F90.0 ATTENTION DEFICIT HYPERACTIVITY DISORDER (ADHD), PREDOMINANTLY INATTENTIVE TYPE: ICD-10-CM

## 2023-06-22 RX ORDER — DEXTROAMPHETAMINE SACCHARATE, AMPHETAMINE ASPARTATE MONOHYDRATE, DEXTROAMPHETAMINE SULFATE AND AMPHETAMINE SULFATE 2.5; 2.5; 2.5; 2.5 MG/1; MG/1; MG/1; MG/1
10 CAPSULE, EXTENDED RELEASE ORAL DAILY
Qty: 30 CAPSULE | Refills: 0 | Status: CANCELLED | OUTPATIENT
Start: 2023-06-22

## 2023-06-22 RX ORDER — DEXTROAMPHETAMINE SACCHARATE, AMPHETAMINE ASPARTATE MONOHYDRATE, DEXTROAMPHETAMINE SULFATE AND AMPHETAMINE SULFATE 2.5; 2.5; 2.5; 2.5 MG/1; MG/1; MG/1; MG/1
10 CAPSULE, EXTENDED RELEASE ORAL DAILY
Qty: 30 CAPSULE | Refills: 0 | Status: SHIPPED | OUTPATIENT
Start: 2023-06-22 | End: 2023-08-18

## 2023-06-22 NOTE — TELEPHONE ENCOUNTER
A.S.    Please see Bravoavia message and message below      Spoke with pharmacist at [facility] [address]  They do have both 6/10 and 7/11 Rx on patient profile.    Only have 2 Adderall XR 10 mg tablets in stock.  Pharmacy deleted the June Rx.    Patient would like June Rx sent to [facility] at [facility] who do have XR 10 mg in stock    Order T'd up    Thank you,  Vidya Harper, RN

## 2023-06-22 NOTE — TELEPHONE ENCOUNTER
See other Mychart message from today.    Hi, please disregard the second Adderall renewal request I sent. I wasn t sure why I got a notification that it was denied, but I just spoke to the pharmacist and everything seems to be fine. Thanks!      Vidya Harper RN

## 2023-07-26 ENCOUNTER — VIRTUAL VISIT (OUTPATIENT)
Dept: FAMILY MEDICINE | Facility: CLINIC | Age: 28
End: 2023-07-26
Attending: FAMILY MEDICINE
Payer: COMMERCIAL

## 2023-07-26 DIAGNOSIS — F90.0 ATTENTION DEFICIT HYPERACTIVITY DISORDER (ADHD), PREDOMINANTLY INATTENTIVE TYPE: ICD-10-CM

## 2023-07-26 PROCEDURE — 99441 PR PHYSICIAN TELEPHONE EVALUATION 5-10 MIN: CPT | Mod: 93 | Performed by: FAMILY MEDICINE

## 2023-07-26 RX ORDER — DEXTROAMPHETAMINE SACCHARATE, AMPHETAMINE ASPARTATE MONOHYDRATE, DEXTROAMPHETAMINE SULFATE AND AMPHETAMINE SULFATE 2.5; 2.5; 2.5; 2.5 MG/1; MG/1; MG/1; MG/1
10 CAPSULE, EXTENDED RELEASE ORAL DAILY
Qty: 30 CAPSULE | Refills: 0 | Status: CANCELLED | OUTPATIENT
Start: 2023-07-26

## 2023-07-26 RX ORDER — DEXTROAMPHETAMINE SACCHARATE, AMPHETAMINE ASPARTATE MONOHYDRATE, DEXTROAMPHETAMINE SULFATE AND AMPHETAMINE SULFATE 2.5; 2.5; 2.5; 2.5 MG/1; MG/1; MG/1; MG/1
10 CAPSULE, EXTENDED RELEASE ORAL DAILY
Qty: 30 CAPSULE | Refills: 0 | Status: SHIPPED | OUTPATIENT
Start: 2023-09-26 | End: 2023-07-26

## 2023-07-26 RX ORDER — DEXTROAMPHETAMINE SACCHARATE, AMPHETAMINE ASPARTATE MONOHYDRATE, DEXTROAMPHETAMINE SULFATE AND AMPHETAMINE SULFATE 2.5; 2.5; 2.5; 2.5 MG/1; MG/1; MG/1; MG/1
10 CAPSULE, EXTENDED RELEASE ORAL DAILY
Qty: 30 CAPSULE | Refills: 0 | Status: SHIPPED | OUTPATIENT
Start: 2023-07-26 | End: 2023-08-01

## 2023-07-26 RX ORDER — DEXTROAMPHETAMINE SACCHARATE, AMPHETAMINE ASPARTATE MONOHYDRATE, DEXTROAMPHETAMINE SULFATE AND AMPHETAMINE SULFATE 2.5; 2.5; 2.5; 2.5 MG/1; MG/1; MG/1; MG/1
10 CAPSULE, EXTENDED RELEASE ORAL DAILY
Qty: 30 CAPSULE | Refills: 0 | Status: SHIPPED | OUTPATIENT
Start: 2023-08-26 | End: 2023-07-26

## 2023-07-26 NOTE — PROGRESS NOTES
"Jas is a 27 year old who is being evaluated via a billable telephone visit.      What phone number would you like to be contacted at? [phone]  How would you like to obtain your AVS? Adeline    Distant Location (provider location):  On-site    Assessment & Plan     Attention deficit hyperactivity disorder (ADHD), predominantly inattentive type  Needs refill  There was one for her for 7/11 . In any case refill given for a month  - amphetamine-dextroamphetamine (ADDERALL XR) 10 MG 24 hr capsule; Take 1 capsule (10 mg) by mouth daily for 30 days  She has plans to change primary care physician &  clinic to Dr Bonner- has an up coming appointment as well.    That's a great plan    No further follow up at [facility] and continue care and close monitoring with new primary care physicain      Prescription drug management         BMI:   Estimated body mass index is 25.29 kg/m  as calculated from the following:    Height as of 6/16/23: 1.71 m (5' 7.32\").    Weight as of 6/16/23: 73.9 kg (163 lb).           Evangelina Grayson MD  [facility]    Subjective   Jas is a 27 year old, presenting for the following health issues:  No chief complaint on file.      7/26/2023     3:09 PM   Additional Questions   Roomed by Mayr Anne PAGE       ADHD Follow-Up    Date of last ADHD office visit: 05/10/2023  Status since last visit: Improving  Taking controlled (daily) medications as prescribed: Yes, but have missed a couple days here and there                       Parent/Patient Concerns with Medications: None  ADHD Medication       Amphetamines Disp Start End     amphetamine-dextroamphetamine (ADDERALL XR) 10 MG 24 hr capsule    30 capsule 6/22/2023     Sig - Route: Take 1 capsule (10 mg) by mouth daily - Oral    Class: E-Prescribe    Earliest Fill Date: 6/22/2023    No prior authorization was found for this prescription.    Found prior authorization for another prescription for the same medication: Closed - Other " "    amphetamine-dextroamphetamine (ADDERALL XR) 10 MG 24 hr capsule    30 capsule 7/11/2023 8/10/2023    Sig - Route: Take 1 capsule (10 mg) by mouth daily for 30 days - Oral    Patient not taking: Reported on 7/26/2023       Class: E-Prescribe    Earliest Fill Date: 7/8/2023    Prior authorization: Closed - Prior Authorization duplicate/in process              Medication Benefits:   Controlled symptoms: Attention span, Distractability, and Finishing tasks  Uncontrolled Symptoms : None    Medication side effects:  Side effects noted: appetite suppression  Denies : weight loss, insomnia, tics, palpitations, stomach ache, headache, emotional lability, rebound irritability, drowsiness, \"zombie\" effect, growth suppression, and dry mouth          Review of Systems   Constitutional, HEENT, cardiovascular, pulmonary, GI, , musculoskeletal, neuro, skin, endocrine and psych systems are negative, except as otherwise noted.      Objective    Vitals - Patient Reported  Weight (Patient Reported): 72.6 kg (160 lb)  Height (Patient Reported): 170.2 cm (5' 7\")  BMI (Based on Pt Reported Ht/Wt): 25.06        Physical Exam   healthy, alert, and no distress  PSYCH: Alert and oriented times 3; coherent speech, normal   rate and volume, able to articulate logical thoughts, able   to abstract reason, no tangential thoughts, no hallucinations   or delusions  Jas Tereza Escobar's affect is normal  RESP: No cough, no audible wheezing, able to talk in full sentences  Remainder of exam unable to be completed due to telephone visits          Phone call duration: 6 minutes      "

## 2023-07-31 ENCOUNTER — MYC MEDICAL ADVICE (OUTPATIENT)
Dept: FAMILY MEDICINE | Facility: CLINIC | Age: 28
End: 2023-07-31
Payer: COMMERCIAL

## 2023-08-01 ENCOUNTER — MYC REFILL (OUTPATIENT)
Dept: FAMILY MEDICINE | Facility: CLINIC | Age: 28
End: 2023-08-01
Payer: COMMERCIAL

## 2023-08-01 DIAGNOSIS — F90.0 ATTENTION DEFICIT HYPERACTIVITY DISORDER (ADHD), PREDOMINANTLY INATTENTIVE TYPE: ICD-10-CM

## 2023-08-01 RX ORDER — DEXTROAMPHETAMINE SACCHARATE, AMPHETAMINE ASPARTATE MONOHYDRATE, DEXTROAMPHETAMINE SULFATE AND AMPHETAMINE SULFATE 2.5; 2.5; 2.5; 2.5 MG/1; MG/1; MG/1; MG/1
10 CAPSULE, EXTENDED RELEASE ORAL DAILY
Qty: 30 CAPSULE | Refills: 0 | Status: SHIPPED | OUTPATIENT
Start: 2023-08-01 | End: 2023-08-18

## 2023-08-01 NOTE — TELEPHONE ENCOUNTER
Requested Prescriptions   Pending Prescriptions Disp Refills    amphetamine-dextroamphetamine (ADDERALL XR) 10 MG 24 hr capsule 30 capsule 0     Sig: Take 1 capsule (10 mg) by mouth daily       There is no refill protocol information for this order        Routing refill request to provider for review/approval because:  Needs to change pharmacy due to lack of inventory   New pharmacy pended    Yudith THEODORE RN  [facility]

## 2023-08-18 ENCOUNTER — OFFICE VISIT (OUTPATIENT)
Dept: FAMILY MEDICINE | Facility: CLINIC | Age: 28
End: 2023-08-18
Payer: COMMERCIAL

## 2023-08-18 VITALS
WEIGHT: 161.5 LBS | OXYGEN SATURATION: 98 % | BODY MASS INDEX: 24.48 KG/M2 | RESPIRATION RATE: 20 BRPM | HEART RATE: 78 BPM | HEIGHT: 68 IN | DIASTOLIC BLOOD PRESSURE: 68 MMHG | SYSTOLIC BLOOD PRESSURE: 112 MMHG

## 2023-08-18 DIAGNOSIS — Z13.220 LIPID SCREENING: ICD-10-CM

## 2023-08-18 DIAGNOSIS — Z51.81 ENCOUNTER FOR THERAPEUTIC DRUG MONITORING: ICD-10-CM

## 2023-08-18 DIAGNOSIS — F64.9 GENDER INCONGRUENCE: Primary | ICD-10-CM

## 2023-08-18 DIAGNOSIS — F90.0 ATTENTION DEFICIT HYPERACTIVITY DISORDER (ADHD), PREDOMINANTLY INATTENTIVE TYPE: ICD-10-CM

## 2023-08-18 LAB
ALBUMIN SERPL BCG-MCNC: 4.8 G/DL (ref 3.5–5.2)
ALP SERPL-CCNC: 59 U/L (ref 35–129)
ALT SERPL W P-5'-P-CCNC: 16 U/L (ref 0–70)
AMPHETAMINES UR QL: NOT DETECTED
ANION GAP SERPL CALCULATED.3IONS-SCNC: 9 MMOL/L (ref 7–15)
AST SERPL W P-5'-P-CCNC: 16 U/L (ref 0–45)
BARBITURATES UR QL SCN: NOT DETECTED
BENZODIAZ UR QL SCN: NOT DETECTED
BILIRUB SERPL-MCNC: 0.4 MG/DL
BUN SERPL-MCNC: 10.6 MG/DL (ref 6–20)
BUPRENORPHINE UR QL: NOT DETECTED
CALCIUM SERPL-MCNC: 10 MG/DL (ref 8.6–10)
CANNABINOIDS UR QL: NOT DETECTED
CHLORIDE SERPL-SCNC: 106 MMOL/L (ref 98–107)
CHOLEST SERPL-MCNC: 176 MG/DL
COCAINE UR QL SCN: NOT DETECTED
CREAT SERPL-MCNC: 0.82 MG/DL (ref 0.51–1.17)
D-METHAMPHET UR QL: NOT DETECTED
DEPRECATED HCO3 PLAS-SCNC: 26 MMOL/L (ref 22–29)
GFR SERPL CREATININE-BSD FRML MDRD: >90 ML/MIN/1.73M2
GLUCOSE SERPL-MCNC: 79 MG/DL (ref 70–99)
HDLC SERPL-MCNC: 55 MG/DL
LDLC SERPL CALC-MCNC: 107 MG/DL
METHADONE UR QL SCN: NOT DETECTED
NONHDLC SERPL-MCNC: 121 MG/DL
OPIATES UR QL SCN: NOT DETECTED
OXYCODONE UR QL SCN: NOT DETECTED
PCP UR QL SCN: NOT DETECTED
POTASSIUM SERPL-SCNC: 4.9 MMOL/L (ref 3.4–5.3)
PROPOXYPH UR QL: NOT DETECTED
PROT SERPL-MCNC: 7.3 G/DL (ref 6.4–8.3)
SODIUM SERPL-SCNC: 141 MMOL/L (ref 136–145)
TRICYCLICS UR QL SCN: NOT DETECTED
TRIGL SERPL-MCNC: 71 MG/DL

## 2023-08-18 PROCEDURE — 84403 ASSAY OF TOTAL TESTOSTERONE: CPT | Performed by: FAMILY MEDICINE

## 2023-08-18 PROCEDURE — 80061 LIPID PANEL: CPT | Performed by: FAMILY MEDICINE

## 2023-08-18 PROCEDURE — 36415 COLL VENOUS BLD VENIPUNCTURE: CPT | Performed by: FAMILY MEDICINE

## 2023-08-18 PROCEDURE — 99214 OFFICE O/P EST MOD 30 MIN: CPT | Performed by: FAMILY MEDICINE

## 2023-08-18 PROCEDURE — 80053 COMPREHEN METABOLIC PANEL: CPT | Performed by: FAMILY MEDICINE

## 2023-08-18 PROCEDURE — 80306 DRUG TEST PRSMV INSTRMNT: CPT | Performed by: FAMILY MEDICINE

## 2023-08-18 RX ORDER — DEXTROAMPHETAMINE SACCHARATE, AMPHETAMINE ASPARTATE MONOHYDRATE, DEXTROAMPHETAMINE SULFATE AND AMPHETAMINE SULFATE 2.5; 2.5; 2.5; 2.5 MG/1; MG/1; MG/1; MG/1
10 CAPSULE, EXTENDED RELEASE ORAL DAILY
Qty: 30 CAPSULE | Refills: 0 | Status: SHIPPED | OUTPATIENT
Start: 2023-09-01 | End: 2023-11-06

## 2023-08-18 RX ORDER — TESTOSTERONE GEL, 1% 10 MG/G
4 GEL TRANSDERMAL DAILY
Start: 2023-08-18 | End: 2023-08-18

## 2023-08-18 RX ORDER — DEXTROAMPHETAMINE SACCHARATE, AMPHETAMINE ASPARTATE MONOHYDRATE, DEXTROAMPHETAMINE SULFATE AND AMPHETAMINE SULFATE 2.5; 2.5; 2.5; 2.5 MG/1; MG/1; MG/1; MG/1
10 CAPSULE, EXTENDED RELEASE ORAL DAILY
Qty: 30 CAPSULE | Refills: 0 | Status: SHIPPED | OUTPATIENT
Start: 2023-10-01 | End: 2023-12-05

## 2023-08-18 RX ORDER — TESTOSTERONE GEL, 1% 10 MG/G
4 GEL TRANSDERMAL DAILY
Qty: 75 G | Refills: 1 | Status: SHIPPED | OUTPATIENT
Start: 2023-08-18 | End: 2023-09-14

## 2023-08-18 ASSESSMENT — PAIN SCALES - GENERAL: PAINLEVEL: NO PAIN (0)

## 2023-08-18 NOTE — PROGRESS NOTES
"  {PROVIDER CHARTING PREFERENCE:693622}    Subjective   Jas is a 28 year old, presenting for the following health issues:  A.D.H.D (And would also like to discuss HRT options (gel?))      8/18/2023     1:06 PM   Additional Questions   Roomed by Dominga PATELHCECI    History of Present Illness       Reason for visit:  Medication discussion    Jas Escobar eats 2-3 servings of fruits and vegetables daily.Jas Escobar consumes 1 sweetened beverage(s) daily.Jas Escobar exercises with enough effort to increase Jas Suarezs heart rate 20 to 29 minutes per day.  Jas Escobar exercises with enough effort to increase Jas Escobar's heart rate 4 days per week. Jas Escobar is missing 2 dose(s) of medications per week.  Jas Escobar is not taking prescribed medications regularly due to remembering to take and other.       {SUPERLIST (Optional):978064}  {additonal problems for provider to add (Optional):230241}      Review of Systems   {ROS COMP (Optional):622866}      Objective    /68 (BP Location: Left arm, Patient Position: Sitting, Cuff Size: Adult Regular)   Pulse 78   Resp 20   Ht 1.715 m (5' 7.52\")   Wt 73.3 kg (161 lb 8 oz)   LMP 07/21/2023 (Approximate)   SpO2 98%   BMI 24.91 kg/m    Body mass index is 24.91 kg/m .  Physical Exam   {Exam List (Optional):866231}    {Diagnostic Test Results (Optional):817965}    {AMBULATORY ATTESTATION (Optional):318832}              "

## 2023-08-18 NOTE — PROGRESS NOTES
Assessment/Plan.    ADHD.  Responding well to Adderall.  We will continue Adderall with no dose change.  Urine drug screen today; of note, patient reports recently taking clonazepam for flight anxiety.  [place] prescription monitoring database reviewed today.    Gender care.  Patient appears to be a good candidate for switching from subcutaneous to transdermal testosterone.  Patient has not taken testosterone for a few weeks, so we will check baseline labs.  We will start AndroGel 50 mg daily.  Reviewed importance of keeping area dry for several hours after application as well as the importance of covering area of application to prevent exposing others to medication.    Top surgery referral placed.    Follow-up in 2 months.    Orders Placed This Encounter   Procedures    Drug Abuse Screen Panel 13, Urine (Pain Care Map) - lab collect    Comprehensive metabolic panel (BMP + Alb, Alk Phos, ALT, AST, Total. Bili, TP)    Testosterone, total    Lipid panel reflex to direct LDL Non-fasting    Adult Plastic Surgery  Referral     ----  Chief complaint: A.D.H.D (And would also like to discuss HRT options (gel?))    Social History     Social History Narrative    Updated 6/16/2023: Grew up in [place], [place].  Lives with boyfriend.  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     ADHD.  Diagnosed in 2016.  Symptoms include low motivation, difficulty executing tasks.  Patient also suspects difficulty with auditory processing.  Was prescribed Adderall, Vyvanse, Ritalin and Strattera at various points.  Discontinued medication for about 5 years, then restarted in May 2023.  Since restarting, medication has been helpful for energy level and task completion.  Patient has noticed of slight increase in anxiety and heart rate, but denies dizziness or chest discomfort.  Denies diverting Adderall or taking more than prescribed.  Sometimes does not take the medication.  Denies a history of addiction to other  "substances.    Gender care.  Patient started testosterone around age 21.  Has only used injectable testosterone.  Often forgets to take dose.  Current dose is 50 mg/week, last took dose a couple weeks ago.  Happy with some changes from testosterone, including lower voice, increased facial hair, increased body hair and bottom growth.  Also feels better mentally when taking testosterone.  Most recent [facility] management has been through [facility].    Breast reduction in 2019.  Interested in full mastectomy.    With regard to menstrual cycle, stopped having cycles after starting testosterone.  These then recurred.  Irregular.  Patient would prefer not to have menses, but denies severe dysphoria related to menses.    Patient denies history of DVT, heart disease, liver disease, cancer or sleep apnea.  S/p tubal ligation.    Exam  /68 (BP Location: Left arm, Patient Position: Sitting, Cuff Size: Adult Regular)   Pulse 78   Resp 20   Ht 1.715 m (5' 7.52\")   Wt 73.3 kg (161 lb 8 oz)   LMP 07/21/2023 (Approximate)   SpO2 98%   BMI 24.91 kg/m    Patient's last menstrual period was 07/21/2023 (approximate).  "

## 2023-08-20 PROBLEM — F17.210 CIGARETTE SMOKER: Status: RESOLVED | Noted: 2023-06-20 | Resolved: 2023-08-20

## 2023-08-20 PROBLEM — R53.83 FATIGUE, UNSPECIFIED TYPE: Status: RESOLVED | Noted: 2022-08-10 | Resolved: 2023-08-20

## 2023-08-22 LAB — TESTOST SERPL-MCNC: 53 NG/DL (ref 8–950)

## 2023-09-05 ENCOUNTER — TELEPHONE (OUTPATIENT)
Dept: FAMILY MEDICINE | Facility: CLINIC | Age: 28
End: 2023-09-05
Payer: COMMERCIAL

## 2023-09-05 DIAGNOSIS — F64.9 GENDER INCONGRUENCE: Primary | ICD-10-CM

## 2023-09-12 NOTE — TELEPHONE ENCOUNTER
Central Prior Authorization Team  Phone: [phone]    PA Initiation    Medication: TESTOSTERONE 12.5 MG/ACT (1%) TD GEL  Insurance Company: [employer] Non-Specialty PA's - Phone [phone] Fax [phone]  Pharmacy Filling the Rx: [id number] [address]  Filling Pharmacy Phone: [phone]  Filling Pharmacy Fax:    Start Date: 9/12/2023

## 2023-09-12 NOTE — TELEPHONE ENCOUNTER
Pt requesting update on PA progress, sent over on 9/5. Resending high priority.    Thanks!  Ton Garza RN   [facility]

## 2023-09-13 NOTE — TELEPHONE ENCOUNTER
Central Prior Authorization Team  Phone: [phone]    PRIOR AUTHORIZATION DENIED    Medication: TESTOSTERONE 12.5 MG/ACT (1%) TD GEL  Insurance Company: [employer] Non-Specialty PA's - Phone [phone] Fax [phone]  Denial Date: 9/12/2023  Denial Rational:         Appeal Information:         Patient Notified:

## 2023-09-14 RX ORDER — TESTOSTERONE 1.62 MG/G
2 GEL TRANSDERMAL DAILY
Qty: 75 G | Refills: 2 | Status: SHIPPED | OUTPATIENT
Start: 2023-09-14 | End: 2023-12-05

## 2023-11-05 ENCOUNTER — MYC MEDICAL ADVICE (OUTPATIENT)
Dept: FAMILY MEDICINE | Facility: CLINIC | Age: 28
End: 2023-11-05
Payer: COMMERCIAL

## 2023-11-29 ENCOUNTER — OFFICE VISIT (OUTPATIENT)
Dept: FAMILY MEDICINE | Facility: CLINIC | Age: 28
End: 2023-11-29
Payer: COMMERCIAL

## 2023-11-29 VITALS
HEIGHT: 67 IN | RESPIRATION RATE: 18 BRPM | HEART RATE: 85 BPM | TEMPERATURE: 98.8 F | SYSTOLIC BLOOD PRESSURE: 110 MMHG | OXYGEN SATURATION: 98 % | DIASTOLIC BLOOD PRESSURE: 72 MMHG | BODY MASS INDEX: 24.64 KG/M2 | WEIGHT: 157 LBS

## 2023-11-29 DIAGNOSIS — F90.0 ATTENTION DEFICIT HYPERACTIVITY DISORDER (ADHD), PREDOMINANTLY INATTENTIVE TYPE: ICD-10-CM

## 2023-11-29 DIAGNOSIS — G89.29 CHRONIC LEFT SHOULDER PAIN: ICD-10-CM

## 2023-11-29 DIAGNOSIS — M25.512 CHRONIC LEFT SHOULDER PAIN: ICD-10-CM

## 2023-11-29 DIAGNOSIS — F64.9 GENDER INCONGRUENCE: Primary | ICD-10-CM

## 2023-11-29 PROCEDURE — 36415 COLL VENOUS BLD VENIPUNCTURE: CPT | Performed by: FAMILY MEDICINE

## 2023-11-29 PROCEDURE — 99214 OFFICE O/P EST MOD 30 MIN: CPT | Performed by: FAMILY MEDICINE

## 2023-11-29 PROCEDURE — 84403 ASSAY OF TOTAL TESTOSTERONE: CPT | Performed by: FAMILY MEDICINE

## 2023-11-29 NOTE — COMMUNITY RESOURCES LIST (ENGLISH)
11/29/2023   [facility]  N/A  For questions about this resource list or additional care needs, please contact your primary care clinic or care manager.  Phone: [phone]   Email: N/A   Address: [address] [place] [address]   Hours: N/A        Transportation       Free or low-cost transportation  1  [facility] Distance: 1.34 miles      In-Person   [address] [place] [address]  Language: English  Hours: Mon - Fri 9:00 AM - 12:00 PM , Mon - Fri 1:00 PM - 3:00 PM  Fees: Self Pay   Phone: (577) 265-8603 Email: info@[employer] Website: https://www.In1001.com.org/[place]     2  [facility] Distance: 1.72 miles      In-Person   [address] [place] [address]  Language: English  Hours: Mon - Fri 8:00 AM - 3:00 PM  Fees: Free   Phone: (513) 589-1888 Ext.14 Email: neighborhood@[facility] Website: http://www.[facility].Musicnotes     Transportation to medical appointments  3  [facility] Distance: 5.83 miles      In-Person   [address] [address]  Language: English  Hours: Mon - Sun Open 24 Hours  Fees: Insurance, Self Pay   Phone: (184) 371-5760 Website: https://www.DeNA.[employer]/valerie/?L=true     4  [facility]isted [facility] Distance: 8.35 miles      In-Person   [address] [place] [address]  Language: English, Croatian  Hours: Mon - Sun Appt. Only  Fees: Self Pay   Phone: (860) 638-9648 Email: giancarlo@[employer] Website: http://www.[employer]/          Important Numbers & Websites       Emergency Services   911  City Services   311  Poison Control   (856) 460-6616  Suicide Prevention Lifeline   (172) 432-5873 (TALK)  Child Abuse Hotline   (203) 768-2736 (4-A-Child)  Sexual Assault Hotline   (128) 241-6688 (HOPE)  National Runaway Safeline   (166) 989-6272 (RUNAWAY)  All-Options Talkline   (388) 897-1914  Substance Abuse Referral    (758) 319-9070 (HELP)

## 2023-11-29 NOTE — PROGRESS NOTES
"  {PROVIDER CHARTING PREFERENCE:171526}    Subjective   Jas is a 28 year old, presenting for the following health issues:    Follow Up (Testosterone-started gel instead of injection, need to do blood work ), Recheck Medication (Started ADHD medication ), and Referral (For back and rib pain)      11/29/2023    12:58 PM   Additional Questions   Roomed by Crystal       History of Present Illness       Reason for visit:  Hrt treatment, adhd treatment    Jas Escobar eats 2-3 servings of fruits and vegetables daily.Jas Escobar consumes 1 sweetened beverage(s) daily.Jas Escobar exercises with enough effort to increase Jas Escobar's heart rate 10 to 19 minutes per day.  Jas Escobar exercises with enough effort to increase Jas Escobar's heart rate 5 days per week. Jas Escobar is missing 1 dose(s) of medications per week.       {MA/LPN/RN Pre-Provider Visit Orders- hCG/UA/Strep (Optional):154736}  {SUPERLIST (Optional):767901}  {additonal problems for provider to add (Optional):881256}      Review of Systems   {ROS COMP (Optional):321775}      Objective    /72 (BP Location: Left arm, Patient Position: Sitting, Cuff Size: Adult Regular)   Pulse 85   Temp 98.8  F (37.1  C) (Temporal)   Resp 18   Ht 1.71 m (5' 7.32\")   Wt 71.2 kg (157 lb)   SpO2 98%   BMI 24.35 kg/m    Body mass index is 24.35 kg/m .  Physical Exam   {Exam List (Optional):656501}    {Diagnostic Test Results (Optional):616937}    {AMBULATORY ATTESTATION (Optional):031551}              "

## 2023-11-29 NOTE — COMMUNITY RESOURCES LIST (ENGLISH)
11/29/2023   [facility]  N/A  For questions about this resource list or additional care needs, please contact your primary care clinic or care manager.  Phone: [phone]   Email: N/A   Address: [address] [place] [address]   Hours: N/A        Transportation       Free or low-cost transportation  1  [facility] Distance: 1.34 miles      In-Person   [address] [place] [address]  Language: English  Hours: Mon - Fri 9:00 AM - 12:00 PM , Mon - Fri 1:00 PM - 3:00 PM  Fees: Self Pay   Phone: ([phone] Email: info@[employer] Website: https://www.FlyData.org/[place]     2  [facility] Distance: 1.72 miles      In-Person   [address] [place] [address]  Language: English  Hours: Mon - Fri 8:00 AM - 3:00 PM  Fees: Free   Phone: (984) [phone] Ext.14 Email: neighborhood@[facility] Website: http://www.[facility].[employer]     Transportation to medical appointments  3  [facility] Distance: 5.83 miles      In-Person   [address] [address]  Language: English  Hours: Mon - Sun Open 24 Hours  Fees: Insurance, Self Pay   Phone: (994) 182-6581 Website: https://www.[employer].Consignd/valerie/?L=true     4  Assisted Transport Distance: 8.35 miles      In-Person   [address] [place] [address]  Language: English, Indonesian  Hours: Mon - Sun Appt. Only  Fees: Self Pay   Phone: (793) 998-7854 Email: giancarlo@[employer] Website: http://www.[employer].Consignd/          Important Numbers & Websites       Emergency Services   911  City Services   311  Poison Control   (591) 634-4303  Suicide Prevention Lifeline   (935) 501-8947 (TALK)  Child Abuse Hotline   (351) 563-9607 (4-A-Child)  Sexual Assault Hotline   (928) 285-5795 (HOPE)  National Runaway Safeline   (258) 394-2700 (RUNAWAY)  All-Options Talkline   (954) 813-8874  Substance Abuse Referral    (774) 180-7138 (HELP)

## 2023-11-29 NOTE — PROGRESS NOTES
Assessment/Plan.    Gender diverse.  Tolerating transdermal testosterone.  -Recheck testosterone level  -Continue transdermal testosterone.  Consider dose increase pending today's level  -Discussed option of hormonal treatment of persistent menses.  Patient declines this    ADHD.  Patient reports both decreased effectiveness and increased insomnia with new brand of Adderall.  -Continue to monitor symptoms  -Continue Adderall with no dose change.  [place] prescription monitoring database reviewed today    Chronic left shoulder pain.  Unclear if this is related to reported left hand weakness, which could be secondary to carpal tunnel syndrome.  -Refer to orthopedics    F/u in 3 mos.    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  Has nearly completed Paxlovid course.  Feeling better overall.    At last visit in August, switched to transdermal testosterone.  2 pumps per day.  Patient reports that adherence with gel is significantly better than with injections.  Patient would like to continue testosterone.  When asked about side effects, patient notes increased acne in the shoulder area.    Patient notes mild dysphoria regarding menses.  Menses have been lighter since switching to transdermal testosterone.    ADHD.  Pharmacy dispensed at different brand of Adderall a couple weeks ago.  Patient feels that since that time, Adderall has not been as helpful with executive dysfunction.  It still seems to help with energy level.  Patient also notes increased insomnia since  "switching brands.  They typically take Adderall in the late morning with the hope that the effects will last through the evening.    Anxiety.  Patient reports this has been well controlled.  They discontinued Lexapro several months ago.    Chronic left-sided rib discomfort.  Present for years.  Patient wonders if the rib pain is secondary to finding as an adolescent.    Chronic left shoulder discomfort.  Radiates to the left upper extremity.  Also with hand weakness.  Patient reports that 4 EMGs have been non-diagnostic.  Also had reassuring cervical spine imaging.  Patient met with a physical therapist, who suggested possible thoracic outlet syndrome.  Patient reports that a provider at [facility] has ordered another EMG.  Patient is receiving acupuncture treatments through [facility] as well.    Exam  /72 (BP Location: Left arm, Patient Position: Sitting, Cuff Size: Adult Regular)   Pulse 85   Temp 98.8  F (37.1  C) (Temporal)   Resp 18   Ht 1.71 m (5' 7.32\")   Wt 71.2 kg (157 lb)   SpO2 98%   BMI 24.35 kg/m      No cervical spinous process tenderness.  No left scapular tenderness.    Hypothenar muscle atrophy of left hand.    5/5 strength b/l with flexion/extension at elbows and wrists. Symmetric  and finger abduction strength.  Patient reports mild paresthesias of left hand with Phalen test.  "

## 2023-11-29 NOTE — COMMUNITY RESOURCES LIST (ENGLISH)
11/29/2023   [facility]  N[facility]A  For questions about this resource list or additional care needs, please contact your primary care clinic or care manager.  Phone: [phone]   Email: N/A   Address: [address] [place] [address]   Hours: N/A        Transportation       Free or low-cost transportation  1  [facility] Distance: 1.34 miles      In-Person   [address] [place] [address]  Language: English  Hours: Mon - Fri 9:00 AM - 12:00 PM , Mon - Fri 1:00 PM - 3:00 PM  Fees: Self Pay   Phone: (348) 557-7805 Email: info@[employer] Website: https://www.All My Data.org/[place]     2  [facility] Distance: 1.72 miles      In-Person   [address] [place] [address]  Language: English  Hours: Mon - Fri 8:00 AM - 3:00 PM  Fees: Free   Phone: (531) 347-7344 Ext.14 Email: neighborhood@[facility] Website: http://www.[facility].FSI     Transportation to medical appointments  3  [facility] Distance: 5.83 miles      In-Person   [address] [address]  Language: English  Hours: Mon - Sun Open 24 Hours  Fees: Insurance, Self Pay   Phone: (239) 635-3293 Website: https://www.[employer]."[employer]"/valerie/?L=true     4  Assisted Transport Distance: 8.35 miles      In-Person   [address] [place] [address]  Language: English, German  Hours: Mon - Sun Appt. Only  Fees: Self Pay   Phone: (321) 697-4032 Email: giancarlo@[employer]."[employer]" Website: http://www.[employer]."[employer]"/          Important Numbers & Websites       Emergency Services   911  City Services   311  Poison Control   (567) 152-6184  Suicide Prevention Lifeline   (371) 239-6624 (TALK)  Child Abuse Hotline   (733) 639-6472 (4-A-Child)  Sexual Assault Hotline   (115) 267-5798 (HOPE)  National Runaway Safeline   (589) 105-5874 (RUNAWAY)  All-Options Talkline   (525) 718-6677  Substance Abuse Referral    (254) 748-9636 (HELP)

## 2023-12-01 LAB — TESTOST SERPL-MCNC: 205 NG/DL (ref 8–950)

## 2023-12-05 RX ORDER — DEXTROAMPHETAMINE SACCHARATE, AMPHETAMINE ASPARTATE MONOHYDRATE, DEXTROAMPHETAMINE SULFATE AND AMPHETAMINE SULFATE 2.5; 2.5; 2.5; 2.5 MG/1; MG/1; MG/1; MG/1
10 CAPSULE, EXTENDED RELEASE ORAL DAILY
Qty: 30 CAPSULE | Refills: 0 | Status: SHIPPED | OUTPATIENT
Start: 2024-01-04 | End: 2024-05-08

## 2023-12-05 RX ORDER — TESTOSTERONE 1.62 MG/G
2 GEL TRANSDERMAL DAILY
Qty: 75 G | Refills: 2 | Status: SHIPPED | OUTPATIENT
Start: 2023-12-05 | End: 2024-05-08

## 2023-12-05 RX ORDER — DEXTROAMPHETAMINE SACCHARATE, AMPHETAMINE ASPARTATE MONOHYDRATE, DEXTROAMPHETAMINE SULFATE AND AMPHETAMINE SULFATE 2.5; 2.5; 2.5; 2.5 MG/1; MG/1; MG/1; MG/1
10 CAPSULE, EXTENDED RELEASE ORAL DAILY
Qty: 30 CAPSULE | Refills: 0 | Status: SHIPPED | OUTPATIENT
Start: 2024-02-03 | End: 2024-04-05

## 2023-12-05 RX ORDER — DEXTROAMPHETAMINE SACCHARATE, AMPHETAMINE ASPARTATE MONOHYDRATE, DEXTROAMPHETAMINE SULFATE AND AMPHETAMINE SULFATE 2.5; 2.5; 2.5; 2.5 MG/1; MG/1; MG/1; MG/1
10 CAPSULE, EXTENDED RELEASE ORAL DAILY
Qty: 30 CAPSULE | Refills: 0 | Status: SHIPPED | OUTPATIENT
Start: 2023-12-05 | End: 2024-05-08

## 2024-02-07 ENCOUNTER — MYC REFILL (OUTPATIENT)
Dept: FAMILY MEDICINE | Facility: CLINIC | Age: 29
End: 2024-02-07
Payer: COMMERCIAL

## 2024-02-07 DIAGNOSIS — F90.0 ATTENTION DEFICIT HYPERACTIVITY DISORDER (ADHD), PREDOMINANTLY INATTENTIVE TYPE: ICD-10-CM

## 2024-02-07 RX ORDER — DEXTROAMPHETAMINE SACCHARATE, AMPHETAMINE ASPARTATE MONOHYDRATE, DEXTROAMPHETAMINE SULFATE AND AMPHETAMINE SULFATE 2.5; 2.5; 2.5; 2.5 MG/1; MG/1; MG/1; MG/1
10 CAPSULE, EXTENDED RELEASE ORAL DAILY
Qty: 30 CAPSULE | Refills: 0 | OUTPATIENT
Start: 2024-02-07

## 2024-05-08 ENCOUNTER — OFFICE VISIT (OUTPATIENT)
Dept: FAMILY MEDICINE | Facility: CLINIC | Age: 29
End: 2024-05-08
Payer: COMMERCIAL

## 2024-05-08 VITALS
RESPIRATION RATE: 16 BRPM | WEIGHT: 169 LBS | HEART RATE: 80 BPM | OXYGEN SATURATION: 96 % | HEIGHT: 67 IN | TEMPERATURE: 98.4 F | SYSTOLIC BLOOD PRESSURE: 120 MMHG | DIASTOLIC BLOOD PRESSURE: 70 MMHG | BODY MASS INDEX: 26.53 KG/M2

## 2024-05-08 DIAGNOSIS — F90.0 ATTENTION DEFICIT HYPERACTIVITY DISORDER (ADHD), PREDOMINANTLY INATTENTIVE TYPE: ICD-10-CM

## 2024-05-08 DIAGNOSIS — F64.9 GENDER INCONGRUENCE: ICD-10-CM

## 2024-05-08 DIAGNOSIS — R07.89 ATYPICAL CHEST PAIN: Primary | ICD-10-CM

## 2024-05-08 DIAGNOSIS — L50.8 VIRAL URTICARIA: ICD-10-CM

## 2024-05-08 PROCEDURE — 99214 OFFICE O/P EST MOD 30 MIN: CPT | Performed by: FAMILY MEDICINE

## 2024-05-08 PROCEDURE — 93000 ELECTROCARDIOGRAM COMPLETE: CPT | Performed by: FAMILY MEDICINE

## 2024-05-08 RX ORDER — TESTOSTERONE 1.62 MG/G
2 GEL TRANSDERMAL DAILY
Qty: 75 G | Refills: 2 | Status: SHIPPED | OUTPATIENT
Start: 2024-05-08

## 2024-05-08 RX ORDER — METHYLPHENIDATE HYDROCHLORIDE 18 MG/1
18 TABLET ORAL EVERY MORNING
Status: SHIPPED
Start: 2024-05-08 | End: 2024-05-08

## 2024-05-08 RX ORDER — METHYLPHENIDATE HYDROCHLORIDE 18 MG/1
18 TABLET ORAL EVERY MORNING
Qty: 30 TABLET | Refills: 0 | Status: SHIPPED | OUTPATIENT
Start: 2024-05-08 | End: 2024-09-05

## 2024-05-08 NOTE — PROGRESS NOTES
"  {PROVIDER CHARTING PREFERENCE:176844}    Subjective   Jas is a 28 year old, presenting for the following health issues:    Recheck Medication (Discuss possible alterations to some medications) and Referral (Possible referral for allergy testing)        5/8/2024     2:00 PM   Additional Questions   Roomed by Crystal     History of Present Illness       Reason for visit:  Discuss current medications and possible referral    Jas eats 2-3 servings of fruits and vegetables daily.Jas consumes 1 sweetened beverage(s) daily.Jas exercises with enough effort to increase Jas's heart rate 10 to 19 minutes per day.  Jas exercises with enough effort to increase Jas's heart rate 3 or less days per week. Jas is missing 1 dose(s) of medications per week.       {MA/LPN/RN Pre-Provider Visit Orders- hCG/UA/Strep (Optional):814946}  {SUPERLIST (Optional):981184}  {additonal problems for provider to add (Optional):215746}    {ROS Picklists (Optional):341670}      Objective    /70 (BP Location: Left arm, Patient Position: Sitting, Cuff Size: Adult Regular)   Pulse 80   Temp 98.4  F (36.9  C) (Temporal)   Resp 16   Ht 1.71 m (5' 7.32\")   Wt 76.7 kg (169 lb)   SpO2 96%   BMI 26.22 kg/m    Body mass index is 26.22 kg/m .  Physical Exam   {Exam List (Optional):057476}    {Diagnostic Test Results (Optional):223325}        Signed Electronically by: Andrea Bonner MD  {Email feedback regarding this note to [email or web address]   :793452}  "

## 2024-05-08 NOTE — PATIENT INSTRUCTIONS
Sounds like post-viral hives. For hives, try cetirizine (Zyrtec) or loratadine (Claritin) 20 mg per day until resolved.

## 2024-05-08 NOTE — PROGRESS NOTES
Assessment/Plan.    Chest tightness.  Does not sound like cardiac or pulmonary pain.  Suspect anxiety is contributing.  -Check ECG    ADHD.  Patient notes some Adderall adverse effects and is interested in trying a different medication.  -Reviewed with patient that essentially all stimulant medications have the potential to cause similar adverse effects  -Will replace Adderall extended release 10 mg daily with Concerta 18 mg daily.  [place] prescription monitoring database reviewed today    Gender nonbinary.  Doing well with testosterone.  -Continue current testosterone dose  -Plan for monitoring labs at next visit    Hives.  Patient showed me a picture of lesions and they do appear consistent with hives.  -Reviewed with patient that given apparent association with recent viral illnesses, allergy testing is unlikely to be helpful  -Advised using cetirizine or loratadine 20 mg a day as needed for future episodes    Next visit in 3 months for medication check.    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  Not painful.  More like a sense of not being able to get a full breath.  No associated severe dyspnea.  No radiation of tightness.  Tightness is not associated with exertion.  It is sometimes associated with palpitations.  Episodes last for quite a while and they occur 2 to 3 days/week on average.  Symptoms are more pronounced if patient has not been eating much.  Does not really feel like acid reflux.  No history of asthma.    Patient wonders if chest tightness is secondary to Adderall.   "Also notes dry mouth, decreased appetite and increased anxiety.  Patient also reports frustration with supply shortages.  Has been taking Adderall 5 to 6 days/week on average.  Feels it is helpful with drive.  Patient is interested in trying a new ADHD medication.    Continues testosterone.  Experienced a period of worsening acne, but this has subsided.  Overall, happy with effects of testosterone.    Hives.  Patient has experienced a few episodes of this in the past few months.  Typically involves elbows, knees and buttocks.  Describes hives as very itchy red lesions.  During episodes, also notes hand swelling.  Has not experienced tongue, throat or lip swelling.  Has never experienced allergic reaction requiring epinephrine.  Hives seem to occur following viral illnesses.  Claritin has been helpful.  The episodes last for between 1 and 3 days.    Exam  /70 (BP Location: Left arm, Patient Position: Sitting, Cuff Size: Adult Regular)   Pulse 80   Temp 98.4  F (36.9  C) (Temporal)   Resp 16   Ht 1.71 m (5' 7.32\")   Wt 76.7 kg (169 lb)   SpO2 96%   BMI 26.22 kg/m      lung fields CTAB  heart with regular rate and rhythm, no murmurs  no lower leg edema  "

## 2024-06-06 ENCOUNTER — TELEPHONE (OUTPATIENT)
Dept: PLASTIC SURGERY | Facility: CLINIC | Age: 29
End: 2024-06-06
Payer: COMMERCIAL

## 2024-06-06 NOTE — CONFIDENTIAL NOTE
Writer called pt back from voicemail requesting top surgery consult. Writer relayed that we are not taking new pts, and add Jas to 2026 waitlist, per pt request. Also sent mychart with surgeon referrals.

## 2024-07-27 ENCOUNTER — HEALTH MAINTENANCE LETTER (OUTPATIENT)
Age: 29
End: 2024-07-27

## 2024-08-01 ENCOUNTER — PATIENT OUTREACH (OUTPATIENT)
Dept: CARE COORDINATION | Facility: CLINIC | Age: 29
End: 2024-08-01
Payer: COMMERCIAL

## 2024-08-31 NOTE — TELEPHONE ENCOUNTER
Reason for Call:  Other patient requesting diagnostics code for  CPT: 90744  Please call her back to advise      Detailed comments: anytime    Phone Number Patient can be reached at: Home number on file [phone] (home)    Best Time: anytime    Can we leave a detailed message on this number? YES    Call taken on 3/22/2022 at 5:28 PM by Sarah Mckinney       Adult

## 2024-09-05 ENCOUNTER — OFFICE VISIT (OUTPATIENT)
Dept: FAMILY MEDICINE | Facility: CLINIC | Age: 29
End: 2024-09-05
Attending: FAMILY MEDICINE
Payer: COMMERCIAL

## 2024-09-05 VITALS
BODY MASS INDEX: 25.9 KG/M2 | RESPIRATION RATE: 16 BRPM | WEIGHT: 165 LBS | TEMPERATURE: 98.6 F | OXYGEN SATURATION: 98 % | HEART RATE: 67 BPM | SYSTOLIC BLOOD PRESSURE: 125 MMHG | DIASTOLIC BLOOD PRESSURE: 75 MMHG | HEIGHT: 67 IN

## 2024-09-05 DIAGNOSIS — Z13.220 LIPID SCREENING: ICD-10-CM

## 2024-09-05 DIAGNOSIS — Z13.1 SCREENING FOR DIABETES MELLITUS: ICD-10-CM

## 2024-09-05 DIAGNOSIS — Z13.0 SCREENING FOR DEFICIENCY ANEMIA: ICD-10-CM

## 2024-09-05 DIAGNOSIS — F90.0 ATTENTION DEFICIT HYPERACTIVITY DISORDER (ADHD), PREDOMINANTLY INATTENTIVE TYPE: Primary | ICD-10-CM

## 2024-09-05 DIAGNOSIS — Z11.3 ROUTINE SCREENING FOR STI (SEXUALLY TRANSMITTED INFECTION): ICD-10-CM

## 2024-09-05 DIAGNOSIS — R00.2 PALPITATIONS: ICD-10-CM

## 2024-09-05 DIAGNOSIS — F64.9 GENDER INCONGRUENCE: ICD-10-CM

## 2024-09-05 LAB
ERYTHROCYTE [DISTWIDTH] IN BLOOD BY AUTOMATED COUNT: 12.7 % (ref 10–15)
HCT VFR BLD AUTO: 45.3 % (ref 35–53)
HGB BLD-MCNC: 15 G/DL (ref 11.7–17.7)
MCH RBC QN AUTO: 29.4 PG (ref 26.5–33)
MCHC RBC AUTO-ENTMCNC: 33.1 G/DL (ref 31.5–36.5)
MCV RBC AUTO: 89 FL (ref 78–100)
PLATELET # BLD AUTO: 281 10E3/UL (ref 150–450)
RBC # BLD AUTO: 5.1 10E6/UL (ref 3.8–5.9)
WBC # BLD AUTO: 5.4 10E3/UL (ref 4–11)

## 2024-09-05 PROCEDURE — 80053 COMPREHEN METABOLIC PANEL: CPT | Performed by: FAMILY MEDICINE

## 2024-09-05 PROCEDURE — 80061 LIPID PANEL: CPT | Performed by: FAMILY MEDICINE

## 2024-09-05 PROCEDURE — 84403 ASSAY OF TOTAL TESTOSTERONE: CPT | Performed by: FAMILY MEDICINE

## 2024-09-05 PROCEDURE — 85027 COMPLETE CBC AUTOMATED: CPT | Performed by: FAMILY MEDICINE

## 2024-09-05 PROCEDURE — 99214 OFFICE O/P EST MOD 30 MIN: CPT | Performed by: FAMILY MEDICINE

## 2024-09-05 PROCEDURE — 36415 COLL VENOUS BLD VENIPUNCTURE: CPT | Performed by: FAMILY MEDICINE

## 2024-09-05 PROCEDURE — 86708 HEPATITIS A ANTIBODY: CPT | Performed by: FAMILY MEDICINE

## 2024-09-05 PROCEDURE — 86706 HEP B SURFACE ANTIBODY: CPT | Performed by: FAMILY MEDICINE

## 2024-09-05 RX ORDER — BUPROPION HYDROCHLORIDE 150 MG/1
150 TABLET ORAL EVERY MORNING
Qty: 30 TABLET | Refills: 1 | Status: SHIPPED | OUTPATIENT
Start: 2024-09-05

## 2024-09-05 NOTE — PROGRESS NOTES
Assessment/Plan.    ADHD.  Dislikes methylphenidate.  Patient interested in switching from Adderall XR to non-stimulant option due to adverse effects.  Patient thinks that patient was previously intolerant to Strattera, but patient cannot recall specific details.  -Discontinue Adderall XR  -Start bupropion 150 mg daily for 14 days.  At that time, patient will message me regarding whether patient wants to increase to 300 mg daily  -Reviewed potential bupropion adverse effects, including tachycardia, hypertension, physical, appetite suppression, insomnia    Gender nonbinary.  Persistent voice dysphoria.  -Labs as below  -Patient declines testosterone dose increase  -Start voice therapy    Atypical chest discomfort. Reassuring ECG in May 2024.    Will check titers for hepatitis A and B, as patient unsure if they have received these vaccines.  Declines tetanus booster.    Next visit in 3 months for medication check.    Orders Placed This Encounter   Procedures    Hepatitis A Antibody Total    Hepatitis B Surface Antibody    Testosterone, total    CBC with platelets    Comprehensive metabolic panel (BMP + Alb, Alk Phos, ALT, AST, Total. 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  Patient tested negative.  Patient denies COVID symptoms.    At last visit, we switched from extended release Adderall to methylphenidate due to potential adverse effects.  Patient was reporting chest tightness, dry mouth, decreased appetite and increased anxiety.  Patient tried methylphenidate for about a week, but then switched back to Adderall due to perceived lack of benefit from  "methylphenidate and persistent chest symptoms.  Patient has not been taking Adderall very frequently, as patient was not taking classes during the summer.    Sleep is not restful.  Works evenings and has difficulty winding down on those days.  Sometimes wakes up early due to stress/anxiety.  Denies waking with dyspnea or urge to urinate.  Denies loud snoring or witnessed apnea.  Rarely drinks alcohol around bedtime.  Tried melatonin; thinks it was a 3 mg tablet.    Continues testosterone.  Desires deeper voice.  Plans to schedule top surgery soon.    No other medication changes since last visit.    Exam  /75   Pulse 67   Temp 98.6  F (37  C) (Temporal)   Resp 16   Ht 1.71 m (5' 7.32\")   Wt 74.8 kg (165 lb)   SpO2 98%   BMI 25.60 kg/m      Affect normal  "

## 2024-09-06 LAB
ALBUMIN SERPL BCG-MCNC: 4.7 G/DL (ref 3.5–5.2)
ALP SERPL-CCNC: 63 U/L (ref 40–150)
ALT SERPL W P-5'-P-CCNC: 33 U/L (ref 0–70)
ANION GAP SERPL CALCULATED.3IONS-SCNC: 12 MMOL/L (ref 7–15)
AST SERPL W P-5'-P-CCNC: 24 U/L (ref 0–45)
BILIRUB SERPL-MCNC: 0.4 MG/DL
BUN SERPL-MCNC: 11 MG/DL (ref 6–20)
CALCIUM SERPL-MCNC: 9.7 MG/DL (ref 8.8–10.4)
CHLORIDE SERPL-SCNC: 109 MMOL/L (ref 98–107)
CHOLEST SERPL-MCNC: 191 MG/DL
CREAT SERPL-MCNC: 0.83 MG/DL (ref 0.51–1.17)
EGFRCR SERPLBLD CKD-EPI 2021: >90 ML/MIN/1.73M2
FASTING STATUS PATIENT QL REPORTED: ABNORMAL
FASTING STATUS PATIENT QL REPORTED: ABNORMAL
GLUCOSE SERPL-MCNC: 85 MG/DL (ref 70–99)
HAV AB SER QL IA: REACTIVE
HBV SURFACE AB SERPL IA-ACNC: 5.3 M[IU]/ML
HBV SURFACE AB SERPL IA-ACNC: NONREACTIVE M[IU]/ML
HCO3 SERPL-SCNC: 23 MMOL/L (ref 22–29)
HDLC SERPL-MCNC: 56 MG/DL
LDLC SERPL CALC-MCNC: 119 MG/DL
NONHDLC SERPL-MCNC: 135 MG/DL
POTASSIUM SERPL-SCNC: 4.8 MMOL/L (ref 3.4–5.3)
PROT SERPL-MCNC: 7.4 G/DL (ref 6.4–8.3)
SODIUM SERPL-SCNC: 144 MMOL/L (ref 135–145)
TRIGL SERPL-MCNC: 81 MG/DL

## 2024-09-10 LAB — TESTOST SERPL-MCNC: 497 NG/DL (ref 8–950)

## 2024-09-11 PROBLEM — Z78.9 NONIMMUNE TO HEPATITIS B VIRUS: Status: ACTIVE | Noted: 2024-09-11

## 2024-11-04 DIAGNOSIS — F90.0 ATTENTION DEFICIT HYPERACTIVITY DISORDER (ADHD), PREDOMINANTLY INATTENTIVE TYPE: ICD-10-CM

## 2024-11-04 RX ORDER — BUPROPION HYDROCHLORIDE 150 MG/1
150 TABLET ORAL EVERY MORNING
Qty: 30 TABLET | Refills: 1 | Status: SHIPPED | OUTPATIENT
Start: 2024-11-04

## 2024-12-16 ENCOUNTER — OFFICE VISIT (OUTPATIENT)
Dept: FAMILY MEDICINE | Facility: CLINIC | Age: 29
End: 2024-12-16
Payer: COMMERCIAL

## 2024-12-16 VITALS
OXYGEN SATURATION: 98 % | BODY MASS INDEX: 26.21 KG/M2 | DIASTOLIC BLOOD PRESSURE: 79 MMHG | WEIGHT: 167 LBS | TEMPERATURE: 99.5 F | HEIGHT: 67 IN | SYSTOLIC BLOOD PRESSURE: 130 MMHG | RESPIRATION RATE: 16 BRPM | HEART RATE: 74 BPM

## 2024-12-16 DIAGNOSIS — Z12.4 CERVICAL CANCER SCREENING: ICD-10-CM

## 2024-12-16 DIAGNOSIS — Z01.818 PREOP EXAMINATION: Primary | ICD-10-CM

## 2024-12-16 DIAGNOSIS — F64.9 GENDER INCONGRUENCE: ICD-10-CM

## 2024-12-16 PROCEDURE — G0145 SCR C/V CYTO,THINLAYER,RESCR: HCPCS | Performed by: FAMILY MEDICINE

## 2024-12-16 PROCEDURE — 99214 OFFICE O/P EST MOD 30 MIN: CPT | Performed by: FAMILY MEDICINE

## 2024-12-16 RX ORDER — BUPROPION HYDROCHLORIDE 150 MG/1
150 TABLET ORAL EVERY MORNING
COMMUNITY

## 2024-12-16 ASSESSMENT — PAIN SCALES - GENERAL: PAINLEVEL_OUTOF10: NO PAIN (0)

## 2024-12-16 NOTE — PROGRESS NOTES
Preoperative Evaluation  [facility]  [address]  [address]  [facility] [address]  Phone: [phone]  Fax: [phone]  Primary Provider: Andrea Bonner MD  Pre-op Performing Provider: Andrea Bonner MD  Dec 16, 2024         12/16/2024   Surgical Information   What procedure is being done? Double Mastectomy    Facility or Hospital where procedure/surgery will be performed: [facility]    Who is doing the procedure / surgery? Dr. Lia Amor    Date of surgery / procedure: 12/30/2024     Time of surgery / procedure: 7:30 AM     Where do you plan to recover after surgery? at home with family        Patient-reported    Multiple values from one day are sorted in reverse-chronological order     Fax number for surgical facility: Note does not need to be faxed, will be available electronically in Epic.    Assessment and Plan:    Preoperative assessment.  Cardiac risk for the planned procedure: low  Diagnostic studies ordered: none  Medically optimized for proposed surgery: yes  Perioperative med changes:  For 1 week before surgery:  -avoid vitamin supplements  -avoid anti-inflammatory medications such as ibuprofen, Advil, Motrin, naproxen, Aleve, aspirin, diclofenac, and/or meloxicam. A safe alternative is acetaminophen (Tylenol)    Gender dysphoria.  -proceed with proposed surgery    Hx of postoperative nausea.  -defer to Anesthesia team    Cervical cancer screening.  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    Tolerates 1 flight of stairs without dyspnea or  chest pain.        12/16/2024   Pre-Op Questionnaire   Have you ever had a heart attack or stroke? No    Have you ever had surgery on your heart or blood vessels, such as a stent placement, a coronary artery bypass, or surgery on an artery in your head, neck, heart, or legs? No    Do you have chest pain with activity? No    Do you have a history of heart failure? No    Do you currently have a cold, bronchitis or symptoms of other infection? No    Do you have a cough, shortness of breath, or wheezing? No    Do you or anyone in your family have previous history of blood clots? No    Do you or does anyone in your family have a serious bleeding problem such as prolonged bleeding following surgeries or cuts? No    Have you ever had problems with anemia or been told to take iron pills? No    Have you had any abnormal blood loss such as black, tarry or bloody stools, or abnormal vaginal bleeding? No    Have you ever had a blood transfusion? No    Are you willing to have a blood transfusion if it is medically needed before, during, or after your surgery? Yes    Have you or any of your relatives ever had problems with anesthesia? YES, intense nausea following breast reduction   Do you have sleep apnea, excessive snoring or daytime drowsiness? No    Do you have any artifical heart valves or other implanted medical devices like a pacemaker, defibrillator, or continuous glucose monitor? No    Do you have artificial joints? No    Are you allergic to latex? No        Patient-reported     Patient Active Problem List    Diagnosis Date Noted    Gender diverse 06/20/2023     Priority: Medium     Started testosterone around age 21.  Switched from IM to transdermal 8/2023.  Breast reduction in 2019.    Updated 12/5/2023      Raynaud's disease without gangrene 06/20/2023     Priority: Medium    Hypotension, orthostatic 06/20/2023     Priority: Medium    Attention deficit hyperactivity disorder (ADHD), predominantly inattentive type  05/11/2023     Priority: Medium     Diagnosed in 2016 via neuropsychology testing.  Patient also suspects difficulty with auditory processing.  Was prescribed Adderall, Vyvanse, Ritalin and Strattera at various points.  Discontinued medication for about 5 years, then restarted in May 2023.    In spring 2024, reported chest tightness, dry mouth, decreased appetite and increased anxiety with Adderall.  Methylphenidate caused similar adverse effects.  Discontinued stimulants 9/2024.    Updated 9/9/2024      Vitamin D deficiency 08/10/2022     Priority: Medium    Anxiety, generalized 08/10/2022     Priority: Medium      Past Medical History:   Diagnosis Date    Depressive disorder 2009    PONV (postoperative nausea and vomiting)      Past Surgical History:   Procedure Laterality Date    COLONOSCOPY  06/2023    [facility]    LAPAROSCOPIC SALPINGECTOMY Bilateral 03/25/2022    Procedure: LAPAROSCOPY of the pelvis, bilateral salpingectomy, removal of paragard IUD;  Surgeon: Erin Perales MD;  Location: [facility] OR    MAMMOPLASTY REDUCTION  2019    SKIN TAG REMOVAL  06/2023    anal, [facility]    WISDOM TOOTH EXTRACTION      around 2013     Current Outpatient Medications   Medication Sig Dispense Refill    buPROPion (WELLBUTRIN XL) 150 MG 24 hr tablet Take 150 mg by mouth every morning. WILL SOON SWITCH TO BUPROPION  MG PER DAY.      clonazePAM (KLONOPIN) 0.5 MG tablet Take 1 tablet (0.5 mg) by mouth 2 times daily as needed for anxiety 6 tablet 0    ibuprofen (ADVIL/MOTRIN) 600 MG tablet Take 1 tablet (600 mg) by mouth every 6 hours as needed for other (mild and/or inflammatory pain) 24 tablet 0    melatonin 3 MG CAPS Take 3 mg by mouth at bedtime.      testosterone (ANDROGEL 1.62 % PUMP) 20.25 MG/ACT gel Place 3 Pump (60.75 mg) onto the skin daily. Apply from dispenser to clean, dry, intact skin of the upper arms and shoulders. 75 g 2    VITAMIN D PO Take by mouth.      buPROPion (WELLBUTRIN SR) 150 MG  "12 hr tablet Take 1 tablet (150 mg) by mouth every morning. (Patient not taking: Reported on 12/16/2024) 30 tablet 2     No Known Allergies     Social History     Tobacco Use    Smoking status: Former     Current packs/day: 0.00     Types: Cigarettes    Smokeless tobacco: Never   Substance Use Topics    Alcohol use: Yes     Alcohol/week: 1.0 - 2.0 standard drink of alcohol     Types: 1 - 2 Standard drinks or equivalent per week     History   Drug Use    Types: Marijuana     Comment: Topical for shoulder pain     Objective    /79   Pulse 74   Temp 99.5  F (37.5  C) (Temporal)   Resp 16   Ht 1.702 m (5' 7\")   Wt 75.8 kg (167 lb)   LMP 11/16/2024 (Approximate)   SpO2 98%   BMI 26.16 kg/m     Estimated body mass index is 26.16 kg/m  as calculated from the following:    Height as of this encounter: 1.702 m (5' 7\").    Weight as of this encounter: 75.8 kg (167 lb).    Physical Exam  oropharynx without abnormal masses  lung fields CTAB  heart with regular rate and rhythm, no murmurs  abdomen soft, nontender, no palpable masses  no lower leg edema    Normal-appearing external genitalia  Small, benign-appearing polyp at cervix  No other cervix lesions  Angelo Dewitt, visit facilitator, present during  exam    Diagnostics  See 9/5/2024 labs in chart.  "

## 2024-12-16 NOTE — PATIENT INSTRUCTIONS
For 1 week before surgery:  -avoid vitamin supplements  -avoid anti-inflammatory medications such as ibuprofen, Advil, Motrin, naproxen, Aleve, aspirin, diclofenac, and/or meloxicam. A safe alternative is acetaminophen (Tylenol)

## 2024-12-17 PROBLEM — Z78.9 NONIMMUNE TO HEPATITIS B VIRUS: Status: RESOLVED | Noted: 2024-09-11 | Resolved: 2024-12-17

## 2024-12-19 LAB
BKR LAB AP GYN ADEQUACY: NORMAL
BKR LAB AP GYN INTERPRETATION: NORMAL
BKR LAB AP HPV REFLEX: NORMAL
BKR LAB AP LMP: NORMAL
BKR LAB AP PREVIOUS ABNORMAL: NORMAL
PATH REPORT.COMMENTS IMP SPEC: NORMAL
PATH REPORT.COMMENTS IMP SPEC: NORMAL
PATH REPORT.RELEVANT HX SPEC: NORMAL

## 2024-12-30 ENCOUNTER — ANESTHESIA (OUTPATIENT)
Dept: SURGERY | Facility: CLINIC | Age: 29
End: 2024-12-30
Payer: COMMERCIAL

## 2024-12-30 ENCOUNTER — HOSPITAL ENCOUNTER (OUTPATIENT)
Facility: CLINIC | Age: 29
Discharge: HOME | End: 2024-12-30
Attending: PLASTIC SURGERY | Admitting: PLASTIC SURGERY
Payer: COMMERCIAL

## 2024-12-30 ENCOUNTER — ANESTHESIA EVENT (OUTPATIENT)
Dept: SURGERY | Facility: CLINIC | Age: 29
End: 2024-12-30
Payer: COMMERCIAL

## 2024-12-30 VITALS
WEIGHT: 159 LBS | RESPIRATION RATE: 18 BRPM | HEART RATE: 78 BPM | TEMPERATURE: 97.8 F | HEIGHT: 67 IN | OXYGEN SATURATION: 98 % | DIASTOLIC BLOOD PRESSURE: 79 MMHG | BODY MASS INDEX: 24.96 KG/M2 | SYSTOLIC BLOOD PRESSURE: 121 MMHG

## 2024-12-30 DIAGNOSIS — F64.9 GENDER INCONGRUENCE: ICD-10-CM

## 2024-12-30 DIAGNOSIS — I95.1 CHRONIC ORTHOSTATIC HYPOTENSION: Primary | ICD-10-CM

## 2024-12-30 DIAGNOSIS — F41.1 GAD (GENERALIZED ANXIETY DISORDER): ICD-10-CM

## 2024-12-30 LAB
HBV SURFACE AG SERPL QL IA: NONREACTIVE
HCG UR QL: NEGATIVE
HIV 1+2 AB+HIV1 P24 AG SERPL QL IA: NONREACTIVE
HIV 1+2 AB+HIV1P24 AG SERPLBLD IA.RAPID: NON REACTIVE
HIV 1+2 AB+HIV1P24 AG SERPLBLD IA.RAPID: NON REACTIVE
HIV INTERPRETATION: NORMAL

## 2024-12-30 PROCEDURE — 258N000003 HC RX IP 258 OP 636: Performed by: SURGERY

## 2024-12-30 PROCEDURE — 250N000009 HC RX 250: Performed by: PLASTIC SURGERY

## 2024-12-30 PROCEDURE — 250N000011 HC RX IP 250 OP 636: Performed by: SURGERY

## 2024-12-30 PROCEDURE — 250N000025 HC SEVOFLURANE, PER MIN: Performed by: PLASTIC SURGERY

## 2024-12-30 PROCEDURE — 999N000104 HC STATISTIC NO CHARGE: Performed by: INTERNAL MEDICINE

## 2024-12-30 PROCEDURE — 88305 TISSUE EXAM BY PATHOLOGIST: CPT | Mod: 26 | Performed by: STUDENT IN AN ORGANIZED HEALTH CARE EDUCATION/TRAINING PROGRAM

## 2024-12-30 PROCEDURE — 710N000012 HC RECOVERY PHASE 2, PER MINUTE: Performed by: PLASTIC SURGERY

## 2024-12-30 PROCEDURE — 250N000011 HC RX IP 250 OP 636: Performed by: PLASTIC SURGERY

## 2024-12-30 PROCEDURE — 88305 TISSUE EXAM BY PATHOLOGIST: CPT | Mod: TC | Performed by: PLASTIC SURGERY

## 2024-12-30 PROCEDURE — 710N000009 HC RECOVERY PHASE 1, LEVEL 1, PER MIN: Performed by: PLASTIC SURGERY

## 2024-12-30 PROCEDURE — 999N000141 HC STATISTIC PRE-PROCEDURE NURSING ASSESSMENT: Performed by: PLASTIC SURGERY

## 2024-12-30 PROCEDURE — 81025 URINE PREGNANCY TEST: CPT | Performed by: SURGERY

## 2024-12-30 PROCEDURE — 370N000017 HC ANESTHESIA TECHNICAL FEE, PER MIN: Performed by: PLASTIC SURGERY

## 2024-12-30 PROCEDURE — 250N000011 HC RX IP 250 OP 636: Performed by: NURSE ANESTHETIST, CERTIFIED REGISTERED

## 2024-12-30 PROCEDURE — 360N000076 HC SURGERY LEVEL 3, PER MIN: Performed by: PLASTIC SURGERY

## 2024-12-30 PROCEDURE — 250N000009 HC RX 250: Performed by: NURSE ANESTHETIST, CERTIFIED REGISTERED

## 2024-12-30 PROCEDURE — 250N000013 HC RX MED GY IP 250 OP 250 PS 637: Performed by: SURGERY

## 2024-12-30 PROCEDURE — 272N000001 HC OR GENERAL SUPPLY STERILE: Performed by: PLASTIC SURGERY

## 2024-12-30 PROCEDURE — 250N000013 HC RX MED GY IP 250 OP 250 PS 637: Performed by: PLASTIC SURGERY

## 2024-12-30 RX ORDER — LABETALOL HYDROCHLORIDE 5 MG/ML
10 INJECTION, SOLUTION INTRAVENOUS
Status: DISCONTINUED | OUTPATIENT
Start: 2024-12-30 | End: 2024-12-30 | Stop reason: HOSPADM

## 2024-12-30 RX ORDER — BUPIVACAINE HYDROCHLORIDE 2.5 MG/ML
INJECTION, SOLUTION EPIDURAL; INFILTRATION; INTRACAUDAL
Status: DISCONTINUED
Start: 2024-12-30 | End: 2024-12-30 | Stop reason: HOSPADM

## 2024-12-30 RX ORDER — NALOXONE HYDROCHLORIDE 0.4 MG/ML
0.1 INJECTION, SOLUTION INTRAMUSCULAR; INTRAVENOUS; SUBCUTANEOUS
Status: DISCONTINUED | OUTPATIENT
Start: 2024-12-30 | End: 2024-12-30 | Stop reason: HOSPADM

## 2024-12-30 RX ORDER — ACETAMINOPHEN 500 MG
1000 TABLET ORAL ONCE
Status: COMPLETED | OUTPATIENT
Start: 2024-12-30 | End: 2024-12-30

## 2024-12-30 RX ORDER — PROPOFOL 10 MG/ML
INJECTION, EMULSION INTRAVENOUS CONTINUOUS PRN
Status: DISCONTINUED | OUTPATIENT
Start: 2024-12-30 | End: 2024-12-30

## 2024-12-30 RX ORDER — HYDROCODONE BITARTRATE AND ACETAMINOPHEN 5; 325 MG/1; MG/1
1 TABLET ORAL EVERY 6 HOURS PRN
Qty: 10 TABLET | Refills: 0 | Status: SHIPPED | OUTPATIENT
Start: 2024-12-30 | End: 2025-01-02

## 2024-12-30 RX ORDER — ACETAMINOPHEN 500 MG
500-1000 TABLET ORAL EVERY 6 HOURS PRN
COMMUNITY

## 2024-12-30 RX ORDER — SODIUM CHLORIDE, SODIUM LACTATE, POTASSIUM CHLORIDE, CALCIUM CHLORIDE 600; 310; 30; 20 MG/100ML; MG/100ML; MG/100ML; MG/100ML
INJECTION, SOLUTION INTRAVENOUS CONTINUOUS
Status: DISCONTINUED | OUTPATIENT
Start: 2024-12-30 | End: 2024-12-30 | Stop reason: HOSPADM

## 2024-12-30 RX ORDER — ONDANSETRON 2 MG/ML
INJECTION INTRAMUSCULAR; INTRAVENOUS PRN
Status: DISCONTINUED | OUTPATIENT
Start: 2024-12-30 | End: 2024-12-30

## 2024-12-30 RX ORDER — HYDRALAZINE HYDROCHLORIDE 20 MG/ML
2.5-5 INJECTION INTRAMUSCULAR; INTRAVENOUS EVERY 10 MIN PRN
Status: DISCONTINUED | OUTPATIENT
Start: 2024-12-30 | End: 2024-12-30 | Stop reason: HOSPADM

## 2024-12-30 RX ORDER — BUPIVACAINE HYDROCHLORIDE 2.5 MG/ML
INJECTION, SOLUTION INFILTRATION; PERINEURAL PRN
Status: DISCONTINUED | OUTPATIENT
Start: 2024-12-30 | End: 2024-12-30 | Stop reason: HOSPADM

## 2024-12-30 RX ORDER — MAGNESIUM HYDROXIDE 1200 MG/15ML
LIQUID ORAL PRN
Status: DISCONTINUED | OUTPATIENT
Start: 2024-12-30 | End: 2024-12-30

## 2024-12-30 RX ORDER — LIDOCAINE 40 MG/G
CREAM TOPICAL
Status: DISCONTINUED | OUTPATIENT
Start: 2024-12-30 | End: 2024-12-30 | Stop reason: HOSPADM

## 2024-12-30 RX ORDER — DEXAMETHASONE SODIUM PHOSPHATE 4 MG/ML
INJECTION, SOLUTION INTRA-ARTICULAR; INTRALESIONAL; INTRAMUSCULAR; INTRAVENOUS; SOFT TISSUE PRN
Status: DISCONTINUED | OUTPATIENT
Start: 2024-12-30 | End: 2024-12-30

## 2024-12-30 RX ORDER — DEXAMETHASONE SODIUM PHOSPHATE 4 MG/ML
4 INJECTION, SOLUTION INTRA-ARTICULAR; INTRALESIONAL; INTRAMUSCULAR; INTRAVENOUS; SOFT TISSUE
Status: DISCONTINUED | OUTPATIENT
Start: 2024-12-30 | End: 2024-12-30 | Stop reason: HOSPADM

## 2024-12-30 RX ORDER — CEFAZOLIN SODIUM/WATER 2 G/20 ML
2 SYRINGE (ML) INTRAVENOUS SEE ADMIN INSTRUCTIONS
Status: DISCONTINUED | OUTPATIENT
Start: 2024-12-30 | End: 2024-12-30 | Stop reason: HOSPADM

## 2024-12-30 RX ORDER — FENTANYL CITRATE 0.05 MG/ML
25 INJECTION, SOLUTION INTRAMUSCULAR; INTRAVENOUS
Status: DISCONTINUED | OUTPATIENT
Start: 2024-12-30 | End: 2024-12-30 | Stop reason: HOSPADM

## 2024-12-30 RX ORDER — ONDANSETRON 4 MG/1
4 TABLET, ORALLY DISINTEGRATING ORAL EVERY 30 MIN PRN
Status: DISCONTINUED | OUTPATIENT
Start: 2024-12-30 | End: 2024-12-30 | Stop reason: HOSPADM

## 2024-12-30 RX ORDER — HYDROMORPHONE HCL IN WATER/PF 6 MG/30 ML
0.4 PATIENT CONTROLLED ANALGESIA SYRINGE INTRAVENOUS EVERY 5 MIN PRN
Status: DISCONTINUED | OUTPATIENT
Start: 2024-12-30 | End: 2024-12-30 | Stop reason: HOSPADM

## 2024-12-30 RX ORDER — OXYCODONE HYDROCHLORIDE 5 MG/1
5 TABLET ORAL ONCE
Status: COMPLETED | OUTPATIENT
Start: 2024-12-30 | End: 2024-12-30

## 2024-12-30 RX ORDER — ONDANSETRON 2 MG/ML
4 INJECTION INTRAMUSCULAR; INTRAVENOUS EVERY 30 MIN PRN
Status: DISCONTINUED | OUTPATIENT
Start: 2024-12-30 | End: 2024-12-30 | Stop reason: HOSPADM

## 2024-12-30 RX ORDER — FENTANYL CITRATE 0.05 MG/ML
50 INJECTION, SOLUTION INTRAMUSCULAR; INTRAVENOUS EVERY 5 MIN PRN
Status: DISCONTINUED | OUTPATIENT
Start: 2024-12-30 | End: 2024-12-30 | Stop reason: HOSPADM

## 2024-12-30 RX ORDER — LIDOCAINE HYDROCHLORIDE 20 MG/ML
INJECTION, SOLUTION INFILTRATION; PERINEURAL PRN
Status: DISCONTINUED | OUTPATIENT
Start: 2024-12-30 | End: 2024-12-30

## 2024-12-30 RX ORDER — DEXMEDETOMIDINE HYDROCHLORIDE 4 UG/ML
INJECTION, SOLUTION INTRAVENOUS PRN
Status: DISCONTINUED | OUTPATIENT
Start: 2024-12-30 | End: 2024-12-30

## 2024-12-30 RX ORDER — HYDROMORPHONE HCL IN WATER/PF 6 MG/30 ML
0.2 PATIENT CONTROLLED ANALGESIA SYRINGE INTRAVENOUS EVERY 5 MIN PRN
Status: DISCONTINUED | OUTPATIENT
Start: 2024-12-30 | End: 2024-12-30 | Stop reason: HOSPADM

## 2024-12-30 RX ORDER — PROPOFOL 10 MG/ML
INJECTION, EMULSION INTRAVENOUS PRN
Status: DISCONTINUED | OUTPATIENT
Start: 2024-12-30 | End: 2024-12-30

## 2024-12-30 RX ORDER — FENTANYL CITRATE 50 UG/ML
INJECTION, SOLUTION INTRAMUSCULAR; INTRAVENOUS PRN
Status: DISCONTINUED | OUTPATIENT
Start: 2024-12-30 | End: 2024-12-30

## 2024-12-30 RX ORDER — FENTANYL CITRATE 0.05 MG/ML
25 INJECTION, SOLUTION INTRAMUSCULAR; INTRAVENOUS EVERY 5 MIN PRN
Status: DISCONTINUED | OUTPATIENT
Start: 2024-12-30 | End: 2024-12-30 | Stop reason: HOSPADM

## 2024-12-30 RX ORDER — HYDROXYZINE HYDROCHLORIDE 25 MG/1
25 TABLET, FILM COATED ORAL EVERY 6 HOURS PRN
Status: DISCONTINUED | OUTPATIENT
Start: 2024-12-30 | End: 2024-12-30 | Stop reason: HOSPADM

## 2024-12-30 RX ORDER — CEFAZOLIN SODIUM/WATER 2 G/20 ML
2 SYRINGE (ML) INTRAVENOUS
Status: COMPLETED | OUTPATIENT
Start: 2024-12-30 | End: 2024-12-30

## 2024-12-30 RX ADMIN — DEXAMETHASONE SODIUM PHOSPHATE 4 MG: 4 INJECTION, SOLUTION INTRA-ARTICULAR; INTRALESIONAL; INTRAMUSCULAR; INTRAVENOUS; SOFT TISSUE at 07:38

## 2024-12-30 RX ADMIN — HYDROMORPHONE HYDROCHLORIDE 0.2 MG: 0.2 INJECTION, SOLUTION INTRAMUSCULAR; INTRAVENOUS; SUBCUTANEOUS at 09:57

## 2024-12-30 RX ADMIN — PROPOFOL 50 MCG/KG/MIN: 10 INJECTION, EMULSION INTRAVENOUS at 07:35

## 2024-12-30 RX ADMIN — LIDOCAINE HYDROCHLORIDE 100 MG: 20 INJECTION, SOLUTION INFILTRATION; PERINEURAL at 07:34

## 2024-12-30 RX ADMIN — ONDANSETRON 4 MG: 2 INJECTION INTRAMUSCULAR; INTRAVENOUS at 08:22

## 2024-12-30 RX ADMIN — FENTANYL CITRATE 100 MCG: 50 INJECTION INTRAMUSCULAR; INTRAVENOUS at 07:34

## 2024-12-30 RX ADMIN — OXYCODONE HYDROCHLORIDE 5 MG: 5 TABLET ORAL at 10:22

## 2024-12-30 RX ADMIN — DEXMEDETOMIDINE HYDROCHLORIDE 20 MCG: 200 INJECTION INTRAVENOUS at 08:06

## 2024-12-30 RX ADMIN — HYDROMORPHONE HYDROCHLORIDE 0.4 MG: 0.2 INJECTION, SOLUTION INTRAMUSCULAR; INTRAVENOUS; SUBCUTANEOUS at 09:23

## 2024-12-30 RX ADMIN — MIDAZOLAM 2 MG: 1 INJECTION INTRAMUSCULAR; INTRAVENOUS at 07:30

## 2024-12-30 RX ADMIN — SODIUM CHLORIDE, POTASSIUM CHLORIDE, SODIUM LACTATE AND CALCIUM CHLORIDE: 600; 310; 30; 20 INJECTION, SOLUTION INTRAVENOUS at 07:04

## 2024-12-30 RX ADMIN — PROPOFOL 200 MG: 10 INJECTION, EMULSION INTRAVENOUS at 07:35

## 2024-12-30 RX ADMIN — ACETAMINOPHEN 1000 MG: 500 TABLET, FILM COATED ORAL at 06:52

## 2024-12-30 RX ADMIN — Medication 2 G: at 07:37

## 2024-12-30 ASSESSMENT — ACTIVITIES OF DAILY LIVING (ADL)
ADLS_ACUITY_SCORE: 41

## 2024-12-30 NOTE — OR NURSING
KEYLA drain instructions reviewed with caregiver.  All questions answered. Pt and caregiver state understanding.     High Dose Vitamin A Counseling: Side effects reviewed, pt to contact office should one occur.

## 2024-12-30 NOTE — ANESTHESIA POSTPROCEDURE EVALUATION
Patient: Carol Escobar    Procedure: Procedure(s):  Bilateral Nipple Sparing Mastectomies       Anesthesia Type:  General    Note:  Disposition: Outpatient   Postop Pain Control: Uneventful            Sign Out: Well controlled pain   PONV: No   Neuro/Psych: Uneventful            Sign Out: Acceptable/Baseline neuro status   Airway/Respiratory: Uneventful            Sign Out: Acceptable/Baseline resp. status   CV/Hemodynamics: Uneventful            Sign Out: Acceptable CV status   Other NRE: NONE   DID A NON-ROUTINE EVENT OCCUR? No           Last vitals:  Vitals Value Taken Time   /79 12/30/24 1015   Temp 36.1  C (97  F) 12/30/24 1015   Pulse 92 12/30/24 1026   Resp 33 12/30/24 1026   SpO2 97 % 12/30/24 1025   Vitals shown include unfiled device data.    Electronically Signed By: Omari Vázquez MD  December 30, 2024  12:05 PM

## 2024-12-30 NOTE — ANESTHESIA PREPROCEDURE EVALUATION
Anesthesia Pre-Procedure Evaluation    Patient: Carol Escobar   MRN: [id number] : 1995        Procedure : Procedure(s):  Bilateral Nipple Sparing Mastectomies          Past Medical History:   Diagnosis Date    Depressive disorder 2009    PONV (postoperative nausea and vomiting)       Past Surgical History:   Procedure Laterality Date    [facility]    LAPAROSCOPIC SALPINGECTOMY Bilateral 2022    Procedure: LAPAROSCOPY of the pelvis, bilateral salpingectomy, removal of paragard IUD;  Surgeon: Erin Perales MD;  Location: [facility] OR    MAMMOPLASTY REDUCTION  2019    SKIN TAG [facility]AL  2023    [facility]    WISDOM TOOTH EXTRACTION      around       No Known Allergies   Social History     Tobacco Use    Smoking status: Former     Current packs/day: 0.00     Types: Cigarettes    Smokeless tobacco: Never    Tobacco comments:     Still smokes cigarettes a couple times a year.  Very rare   Substance Use Topics    Alcohol use: Yes     Alcohol/week: 1.0 - 2.0 standard drink of alcohol     Types: 1 - 2 Standard drinks or equivalent per week     Comment: occassional. irregular      Wt Readings from Last 1 Encounters:   24 72.1 kg (159 lb)        Anesthesia Evaluation   Pt has had prior anesthetic.     History of anesthetic complications  - PONV.      ROS/MED HX  ENT/Pulmonary:       Neurologic:       Cardiovascular:       METS/Exercise Tolerance: >4 METS    Hematologic:  - neg hematologic  ROS     Musculoskeletal:  - neg musculoskeletal ROS     GI/Hepatic:  - neg GI/hepatic ROS     Renal/Genitourinary:  - neg Renal ROS     Endo:  - neg endo ROS     Psychiatric/Substance Use:     (+) psychiatric history depression, anxiety and other (comment) (ADHD)       Infectious Disease:       Malignancy:       Other:            Physical Exam    Airway        Mallampati: I   TM distance: > 3 FB   Neck ROM: full   Mouth opening: > 3 cm    Respiratory Devices and  "Support         Dental       (+) Completely normal teeth      Cardiovascular   cardiovascular exam normal          Pulmonary   pulmonary exam normal                OUTSIDE LABS:  CBC:   Lab Results   Component Value Date    WBC 5.4 09/05/2024    WBC 6.5 05/10/2023    HGB 15.0 09/05/2024    HGB 13.9 05/10/2023    HCT 45.3 09/05/2024    HCT 42.6 05/10/2023     09/05/2024     05/10/2023     BMP:   Lab Results   Component Value Date     09/05/2024     08/18/2023    POTASSIUM 4.8 09/05/2024    POTASSIUM 4.9 08/18/2023    CHLORIDE 109 (H) 09/05/2024    CHLORIDE 106 08/18/2023    CO2 23 09/05/2024    CO2 26 08/18/2023    BUN 11.0 09/05/2024    BUN 10.6 08/18/2023    CR 0.83 09/05/2024    CR 0.82 08/18/2023    GLC 85 09/05/2024    GLC 79 08/18/2023     COAGS: No results found for: \"PTT\", \"INR\", \"FIBR\"  POC:   Lab Results   Component Value Date    HCG Negative 12/30/2024     HEPATIC:   Lab Results   Component Value Date    ALBUMIN 4.7 09/05/2024    PROTTOTAL 7.4 09/05/2024    ALT 33 09/05/2024    AST 24 09/05/2024    ALKPHOS 63 09/05/2024    BILITOTAL 0.4 09/05/2024     OTHER:   Lab Results   Component Value Date    CORINE 9.7 09/05/2024    TSH 0.86 05/10/2023    T4 1.23 05/10/2023       Anesthesia Plan    ASA Status:  2    NPO Status:  NPO Appropriate    Anesthesia Type: General.     - Airway: LMA   Induction: Intravenous, Propofol.   Maintenance: Balanced.        Consents    Anesthesia Plan(s) and associated risks, benefits, and realistic alternatives discussed. Questions answered and patient/representative(s) expressed understanding.     - Discussed: Risks, Benefits and Alternatives for the PROCEDURE were discussed     - Discussed with:  Patient            Postoperative Care    Pain management: Multi-modal analgesia, IV analgesics.   PONV prophylaxis: Ondansetron (or other 5HT-3), Dexamethasone or Solumedrol, Background Propofol Infusion     Comments:               Omari Vázquez MD    I have " reviewed the pertinent notes and labs in the chart from the past 30 days and (re)examined the patient.  Any updates or changes from those notes are reflected in this note.

## 2024-12-30 NOTE — DISCHARGE INSTRUCTIONS
Today you were given 1000 mg of Tylenol at 8:52AM. The recommended daily maximum dose is 4000 mg.      Same Day Surgery Discharge Instructions for  Sedation and General Anesthesia     It's not unusual to feel dizzy, light-headed or faint for up to 24 hours after surgery or while taking pain medication.  If you have these symptoms: sit for a few minutes before standing and have someone assist you when you get up to walk or use the bathroom.    You should rest and relax for the next 24 hours. We recommend you make arrangements to have an adult stay with you for at least 24 hours after your discharge.  Avoid hazardous and strenuous activity.    DO NOT DRIVE any vehicle or operate mechanical equipment for 24 hours following the end of your surgery.  Even though you may feel normal, your reactions may be affected by the medication you have received.    Do not drink alcoholic beverages for 24 hours following surgery.     Slowly progress to your regular diet as you feel able. It's not unusual to feel nauseated and/or vomit after receiving anesthesia.  If you develop these symptoms, drink clear liquids (apple juice, ginger ale, broth, 7-up, etc. ) until you feel better.  If your nausea and vomiting persists for 24 hours, please notify your surgeon.      All narcotic pain medications, along with inactivity and anesthesia, can cause constipation. Drinking plenty of liquids and increasing fiber intake will help.    For any questions of a medical nature, call your surgeon.    Do not make important decisions for 24 hours.    If you had general anesthesia, you may have a sore throat for a couple of days related to the breathing tube used during surgery.  You may use Cepacol lozenges to help with this discomfort.  If it worsens or if you develop a fever, contact your surgeon.     If you feel your pain is not well managed with the pain medications prescribed by your surgeon, please contact your surgeon's office to let them know so  they can address your concerns.     Matthew Ross Drain  Home Care Instructions    What is a Matthew Ross (KEYLA) Drain?  This is a small tube that connects to a bulb.  Its gentle suction removes extra fluid from a surgical wound.  Your doctor will remove the tube when the amount of fluid decreases.  The color and amount of fluid varies.  Right after surgery the fluid is bright red.  Over time, it changes to light pink and may become clear or the color of straw.    How should I care for my tube site?  Keep the skin around the tube dry.  Check with your doctor about how to shower.  You may need to cover the site with plastic when you shower.  Or, it may be okay to let the site get wet and put on a clean bandage after you shower.    If the bandage gets wet, you will need to change it.  How should I care for the bulb?  Keep the bulb compressed at all times except while you empty it.   Attach the bulb to your clothing with tape and a safety pin.  Try to empty the bulb at the same time every day.  Empty the bulb at least once a day, or when the bulb becomes half full.  If it becomes too full, there will not be enough suction.    To empty the bulb:  Wash your hands.  Open the bulb cap.  Drain the fluid from the bulb into the measuring cup.  If you have two drains, use two cups.    Clean the mouth of the bulb with an alcohol wipe if your nurse told you to.  Squeeze the bulb (fold it in half before you close the bulb cap) If it does not stay compressed, call your nurse or clinic.  Write the amount of drainage on the drainage record (see back page).  If you have two drains, write the amount for each bulb.  Flush the drainage down the toilet.  Rinse the measuring cup.  Wash your hands.    When should I call my doctor?   Call your doctor if:  You have a fever over 101 F (38.3 C), taken under the tongue.   The drainage increases or smells bad.  The skin around your tube has increased redness, swelling, warmth or pain.  You have  pus or fluid leaking at the tube site.  Your stitches break.  You think the tube is not draining.  The tube falls out.  You have any problems or concerns.    Your drainage record:    Empty your bulb at least once per day or when 1/2 to 1/3 full.  Write down the date, time and amount of drainage in each bulb.   Bring this record to each clinic visit.    Date Time Bulb 1: amount of  Drainage in (ml or cc) Bulb 2: amount of drainage (in ml or cc) Notes                                                          Date Time Bulb 1: amount of  Drainage in (ml or cc) Bulb 2: amount of drainage (in ml or cc) Notes                                                      Reasons to contact your surgeon:    Signs of possible infection: Check your incision daily for redness, swelling, warmth, red streaks or foul drainage.   Elevated temperature.  Pain not controlled with pain medication and/or rest.   Uncontrolled nausea or vomiting.  Any questions or concerns.      **If you have questions or concerns about your procedure,  call Dr. Amor at [phone]**

## 2024-12-30 NOTE — ANESTHESIA CARE TRANSFER NOTE
Patient: Carol Escobar    Procedure: Procedure(s):  Bilateral Nipple Sparing Mastectomies       Diagnosis: Gender dysphoria [F64.9]  Diagnosis Additional Information: No value filed.    Anesthesia Type:   General     Note:    Oropharynx: oropharynx clear of all foreign objects  Level of Consciousness: awake  Oxygen Supplementation: face mask    Independent Airway: airway patency satisfactory and stable  Dentition: dentition unchanged  Vital Signs Stable: post-procedure vital signs reviewed and stable  Report to RN Given: handoff report given  Patient transferred to: PACU    Handoff Report: Identifed the Patient, Identified the Reponsible Provider, Reviewed the pertinent medical history, Discussed the surgical course, Reviewed Intra-OP anesthesia mangement and issues during anesthesia, Set expectations for post-procedure period and Allowed opportunity for questions and acknowledgement of understanding  Vitals:  Vitals Value Taken Time   /78 12/30/24 0857   Temp     Pulse 70 12/30/24 0900   Resp 9 12/30/24 0900   SpO2 100 % 12/30/24 0900   Vitals shown include unfiled device data.    Electronically Signed By: CHARLEE Beth CRNA  December 30, 2024  9:01 AM

## 2024-12-30 NOTE — OP NOTE
PLASTIC SURGERY OPERATIVE NOTE  Patient Name:  Carol Escobar     Date of Service:  12/30/2024     PREOPERATIVE DIAGNOSES:   1.  Gender dysphoria [F64.9]     POSTOPERATIVE DIAGNOSES:   1.  Gender dysphoria [F64.9]       SURGEON:  Lia Amor MD   OR STAFF:  Circulator: Fidelia Jimenez RN; Anjelica Hung RN  Scrub Person: Kal Prather  First Assistant: Sukhi Tapia     ANESTHESIA:  General     ESTIMATED BLOOD LOSS:  * No blood loss amount entered *   SPECIMEN(S):    ID Type Source Tests Collected by Time Destination   1 : RIGHT BREAST TISSUE Tissue Breast, Right SURGICAL PATHOLOGY EXAM Lia Amor MD 12/30/2024  8:01 AM    2 : LEFT BREAST TISSUE Tissue Breast, Left SURGICAL PATHOLOGY EXAM Lia Amor MD 12/30/2024  8:26 AM           PROCEDURES:   1.  Bilateral mastectomies     INDICATIONS: Patient is a 29-year-old with gender dysphoria.  They previously underwent bilateral breast reduction in treatment of their dysphoria but the dysphoria has persisted.  They present today for bilateral nipple sparing mastectomies.    DESCRIPTION OF PROCEDURE:  Patient was marked for incisions and taken to the operating room.  General anesthesia was administered.  The chest area was prepped and draped in a sterile manner.  About the upright position excess skin along the inframammary fold was marked.  Patient was returned to supine position.  On the right side, an inframammary incision was made and dissection was carried down to the underlying chest wall.  Dissection was carried cephalad to the superior aspect of the marking.  The superior incision was then made and a wedge of skin and subcutaneous tissue and breast tissue was removed inferiorly.  Hemostasis was achieved.  Additional breast tissue was removed and the superior mastectomy flap.  Hemostasis was achieved.    The inframammary fold was obliterated using electrocautery.  The incision was then reapproximated interrupted 2-0  PDS in the superficial fascia followed by interrupted 3-0 Monocryl in the subcutaneous tissue and a running 4-0 subcuticular Monocryl suture.  Prior to closure a 15 Citizen of [place] KEYLA drain was placed and secured with a 3-0 nylon suture.    A similar procedure was completed on the other side, an inframammary incision was made and dissection was carried down to underlying chest wall.  Dissection was carried cephalad to the superior aspect of the marking.  Hemostasis was achieved.  The superior incision was then made and a wedge of skin/subcutaneous tissue/breast tissue was removed inferiorly.  Hemostasis was achieved.  Additional breast tissue was removed from the superior mastectomy flap.  Hemostasis was achieved.    The inframammary fold was obliterated using electrocautery.  The incision was then reapproximated interrupted 2-0 PDS in the superficial fascia followed by interrupted 3-0 Monocryl in the subcutaneous tissue and a running 4-0 subcuticular Monocryl suture.  Prior to closure a 15 Citizen of [place] KEYLA drain was placed and secured with a 3-0 nylon suture.    Benzoin and Steri-Strips were placed along the inframammary incisions followed by gauze and Ace wrap.    FINDINGS:  Right breast tissue weighed 140 grams and left breast tissue weighed 155 grams.      MD [facility]   [phone]

## 2024-12-30 NOTE — OR NURSING
Meets criteria for discharge.  Discharge instructions reviewed with pt and pt's designated responsible party.  Pt label on prescription bag from pharmacy matched to pt's wristband. Pharmacy bag opened with one prescriptions inside. Medications were reviewed to match pt wristband while pt and significant other agreed with identification. Prescriptions placed back in pharmacy bag resealed with tape and given to partner Jared per pt request.

## 2024-12-30 NOTE — ANESTHESIA PROCEDURE NOTES
Airway       Patient location during procedure: OR  Staff -        CRNA: Shukri Alberto APRN CRNA       Performed By: CRNA  Consent for Airway        Urgency: elective  Indications and Patient Condition       Indications for airway management: thi-procedural and airway protection       Induction type:intravenous       Mask difficulty assessment: 0 - not attempted    Final Airway Details       Final airway type: supraglottic airway    Supraglottic Airway Details        Type: LMA       Brand: I-Gel       LMA size: 5    Post intubation assessment        Placement verified by: capnometry, equal breath sounds and chest rise        Number of attempts at approach: 1       Ease of procedure: easy       Dentition: Intact and Unchanged

## 2024-12-31 LAB
HCV RNA SERPL NAA+PROBE-ACNC: NOT DETECTED IU/ML
PATH REPORT.COMMENTS IMP SPEC: NORMAL
PATH REPORT.COMMENTS IMP SPEC: NORMAL
PATH REPORT.FINAL DX SPEC: NORMAL
PATH REPORT.GROSS SPEC: NORMAL
PATH REPORT.MICROSCOPIC SPEC OTHER STN: NORMAL
PATH REPORT.RELEVANT HX SPEC: NORMAL
PHOTO IMAGE: NORMAL

## 2025-01-07 NOTE — TELEPHONE ENCOUNTER
As tolerated   Provider E-Visit time total (minutes): 20 minutes.     PHQ 5/30/2022   PHQ-9 Total Score 10   Q9: Thoughts of better off dead/self-harm past 2 weeks Not at all     JAMES-7 SCORE 5/30/2022   Total Score 11 (moderate anxiety)   Total Score 11

## 2025-02-17 ENCOUNTER — VIRTUAL VISIT (OUTPATIENT)
Dept: FAMILY MEDICINE | Facility: CLINIC | Age: 30
End: 2025-02-17
Attending: FAMILY MEDICINE
Payer: COMMERCIAL

## 2025-02-17 DIAGNOSIS — Z13.0 SCREENING FOR DEFICIENCY ANEMIA: ICD-10-CM

## 2025-02-17 DIAGNOSIS — F64.9 GENDER INCONGRUENCE: Primary | ICD-10-CM

## 2025-02-17 DIAGNOSIS — F90.0 ATTENTION DEFICIT HYPERACTIVITY DISORDER (ADHD), PREDOMINANTLY INATTENTIVE TYPE: ICD-10-CM

## 2025-02-17 DIAGNOSIS — R09.81 CHRONIC NASAL CONGESTION: ICD-10-CM

## 2025-02-17 PROCEDURE — 98006 SYNCH AUDIO-VIDEO EST MOD 30: CPT | Performed by: FAMILY MEDICINE

## 2025-02-17 RX ORDER — BUPROPION HYDROCHLORIDE 150 MG/1
150 TABLET, EXTENDED RELEASE ORAL EVERY MORNING
Qty: 90 TABLET | Refills: 3 | Status: SHIPPED | OUTPATIENT
Start: 2025-02-17

## 2025-02-17 RX ORDER — AMOXICILLIN 500 MG/1
500 TABLET, FILM COATED ORAL 2 TIMES DAILY
COMMUNITY
Start: 2025-02-17 | End: 2025-02-24

## 2025-02-17 NOTE — PROGRESS NOTES
Assessment/Plan    Problem   Nasal congestion, chronic    Sense of poor airflow through nose. Jas thinks they may have deviated septum. No hx of nasal fracture. Uses nose strips.  -refer to ENT     Gender diverse    Started testosterone around age 21.  Switched from IM to transdermal 8/2023.  Top surgery 12/2024.    2/17/2025  Happy w/ outcome from top surgery. Still some swelling.    T dose increased 11/2024. Perhaps increased upper lip hair. Was hoping for voice drop, but no perceived change yet. Mild increase in acne.    -continue testosterone gel 60.75 mg/d  -labs  -discussed minoxidil for hair growth. Jas would like to avoid topical (?adverse effect to cat w/ exposure). Jas will consider oral minoxidil. Reviewed risk of dizziness     Attention Deficit Hyperactivity Disorder (Adhd), Predominantly Inattentive Type    Diagnosed in 2016 via neuropsychology testing.  Patient also suspects difficulty with auditory processing.  Was prescribed Adderall, Vyvanse, Ritalin and Strattera at various points.  Discontinued medication for about 5 years, then restarted in May 2023.    In spring 2024, reported chest tightness, dry mouth, decreased appetite and increased anxiety with Adderall.  Methylphenidate caused similar adverse effects.  Discontinued stimulants 9/2024.    2/17/2025  Changed from XL to SR bupropion in 11/2024. Sleep has improved. ADHD symptom control ok overall.  -continue bupropion  mg/d       Orders Placed This Encounter   Procedures    Testosterone, total    CBC with platelets    Comprehensive metabolic panel (BMP + Alb, Alk Phos, ALT, AST, Total. Bili, TP)    Ferritin    Adult ENT  Referral       ----  Chief complaint: Recheck Medication    Social History     Social History Narrative    -Grew up in [place], [place]    -Lives with boyfriend    -Studying court reporting at [employer]    -Works as closed caption     -Previously lived in [place]     On amoxicillin for  otitis.    Exam  LMP 12/17/2024 (Exact Date)   Affect normal    Telehealth platform: [employer] ([employer]). Reason for audio-only visit: n/a.  Location of patient: home. Location of provider: office.  Start time of conversation: 5:06pm. End time of conversation: 5:43pm.

## 2025-02-24 ENCOUNTER — LAB (OUTPATIENT)
Dept: LAB | Facility: CLINIC | Age: 30
End: 2025-02-24
Payer: COMMERCIAL

## 2025-02-24 DIAGNOSIS — F64.9 GENDER INCONGRUENCE: ICD-10-CM

## 2025-02-24 DIAGNOSIS — Z13.0 SCREENING FOR DEFICIENCY ANEMIA: ICD-10-CM

## 2025-02-24 LAB
ERYTHROCYTE [DISTWIDTH] IN BLOOD BY AUTOMATED COUNT: 12.4 % (ref 10–15)
HCT VFR BLD AUTO: 43.9 % (ref 35–53)
HGB BLD-MCNC: 14.3 G/DL (ref 11.7–17.7)
MCH RBC QN AUTO: 28.3 PG (ref 26.5–33)
MCHC RBC AUTO-ENTMCNC: 32.6 G/DL (ref 31.5–36.5)
MCV RBC AUTO: 87 FL (ref 78–100)
PLATELET # BLD AUTO: 301 10E3/UL (ref 150–450)
RBC # BLD AUTO: 5.05 10E6/UL (ref 3.8–5.9)
WBC # BLD AUTO: 5.7 10E3/UL (ref 4–11)

## 2025-02-24 PROCEDURE — 84403 ASSAY OF TOTAL TESTOSTERONE: CPT

## 2025-02-24 PROCEDURE — 36415 COLL VENOUS BLD VENIPUNCTURE: CPT

## 2025-02-24 PROCEDURE — 82728 ASSAY OF FERRITIN: CPT

## 2025-02-24 PROCEDURE — 85027 COMPLETE CBC AUTOMATED: CPT

## 2025-02-24 PROCEDURE — 80053 COMPREHEN METABOLIC PANEL: CPT

## 2025-02-25 LAB
ALBUMIN SERPL BCG-MCNC: 4.4 G/DL (ref 3.5–5.2)
ALP SERPL-CCNC: 66 U/L (ref 40–150)
ALT SERPL W P-5'-P-CCNC: 18 U/L (ref 0–70)
ANION GAP SERPL CALCULATED.3IONS-SCNC: 12 MMOL/L (ref 7–15)
AST SERPL W P-5'-P-CCNC: 19 U/L (ref 0–45)
BILIRUB SERPL-MCNC: 0.3 MG/DL
BUN SERPL-MCNC: 9.6 MG/DL (ref 6–20)
CALCIUM SERPL-MCNC: 9.4 MG/DL (ref 8.8–10.4)
CHLORIDE SERPL-SCNC: 106 MMOL/L (ref 98–107)
CREAT SERPL-MCNC: 0.74 MG/DL (ref 0.51–1.17)
EGFRCR SERPLBLD CKD-EPI 2021: >90 ML/MIN/1.73M2
FERRITIN SERPL-MCNC: 53 NG/ML (ref 6–409)
GLUCOSE SERPL-MCNC: 95 MG/DL (ref 70–99)
HCO3 SERPL-SCNC: 22 MMOL/L (ref 22–29)
POTASSIUM SERPL-SCNC: 4.6 MMOL/L (ref 3.4–5.3)
PROT SERPL-MCNC: 7 G/DL (ref 6.4–8.3)
SODIUM SERPL-SCNC: 140 MMOL/L (ref 135–145)

## 2025-02-27 LAB — TESTOST SERPL-MCNC: 569 NG/DL (ref 8–950)

## 2025-03-04 ENCOUNTER — ANCILLARY PROCEDURE (OUTPATIENT)
Dept: GENERAL RADIOLOGY | Facility: CLINIC | Age: 30
End: 2025-03-04
Attending: PHYSICIAN ASSISTANT
Payer: COMMERCIAL

## 2025-03-04 ENCOUNTER — MYC MEDICAL ADVICE (OUTPATIENT)
Dept: FAMILY MEDICINE | Facility: CLINIC | Age: 30
End: 2025-03-04

## 2025-03-04 ENCOUNTER — OFFICE VISIT (OUTPATIENT)
Dept: FAMILY MEDICINE | Facility: CLINIC | Age: 30
End: 2025-03-04
Payer: COMMERCIAL

## 2025-03-04 VITALS
SYSTOLIC BLOOD PRESSURE: 112 MMHG | OXYGEN SATURATION: 95 % | DIASTOLIC BLOOD PRESSURE: 71 MMHG | BODY MASS INDEX: 25.33 KG/M2 | RESPIRATION RATE: 16 BRPM | TEMPERATURE: 97.4 F | HEIGHT: 67 IN | HEART RATE: 104 BPM | WEIGHT: 161.4 LBS

## 2025-03-04 DIAGNOSIS — R10.31 ABDOMINAL PAIN, RIGHT LOWER QUADRANT: ICD-10-CM

## 2025-03-04 DIAGNOSIS — R10.31 ABDOMINAL PAIN, RIGHT LOWER QUADRANT: Primary | ICD-10-CM

## 2025-03-04 DIAGNOSIS — Z13.0 SCREENING, ANEMIA, DEFICIENCY, IRON: Primary | ICD-10-CM

## 2025-03-04 LAB
ALBUMIN SERPL BCG-MCNC: 4.4 G/DL (ref 3.5–5.2)
ALBUMIN UR-MCNC: NEGATIVE MG/DL
ALP SERPL-CCNC: 71 U/L (ref 40–150)
ALT SERPL W P-5'-P-CCNC: 20 U/L (ref 0–70)
ANION GAP SERPL CALCULATED.3IONS-SCNC: 8 MMOL/L (ref 7–15)
APPEARANCE UR: CLEAR
AST SERPL W P-5'-P-CCNC: 18 U/L (ref 0–45)
BASOPHILS # BLD AUTO: 0 10E3/UL (ref 0–0.2)
BASOPHILS NFR BLD AUTO: 0 %
BILIRUB SERPL-MCNC: 0.4 MG/DL
BILIRUB UR QL STRIP: NEGATIVE
BUN SERPL-MCNC: 12 MG/DL (ref 6–20)
CALCIUM SERPL-MCNC: 9.9 MG/DL (ref 8.8–10.4)
CHLORIDE SERPL-SCNC: 105 MMOL/L (ref 98–107)
CLUE CELLS: ABNORMAL
COLOR UR AUTO: YELLOW
CREAT SERPL-MCNC: 0.83 MG/DL (ref 0.51–1.17)
EGFRCR SERPLBLD CKD-EPI 2021: >90 ML/MIN/1.73M2
EOSINOPHIL # BLD AUTO: 0.2 10E3/UL (ref 0–0.7)
EOSINOPHIL NFR BLD AUTO: 2 %
ERYTHROCYTE [DISTWIDTH] IN BLOOD BY AUTOMATED COUNT: 12.5 % (ref 10–15)
GLUCOSE SERPL-MCNC: 90 MG/DL (ref 70–99)
GLUCOSE UR STRIP-MCNC: NEGATIVE MG/DL
HCO3 SERPL-SCNC: 24 MMOL/L (ref 22–29)
HCT VFR BLD AUTO: 43 % (ref 35–53)
HGB BLD-MCNC: 14.3 G/DL (ref 11.7–17.7)
HGB UR QL STRIP: NEGATIVE
IMM GRANULOCYTES # BLD: 0 10E3/UL
IMM GRANULOCYTES NFR BLD: 0 %
KETONES UR STRIP-MCNC: NEGATIVE MG/DL
LEUKOCYTE ESTERASE UR QL STRIP: NEGATIVE
LYMPHOCYTES # BLD AUTO: 1.6 10E3/UL (ref 0.8–5.3)
LYMPHOCYTES NFR BLD AUTO: 16 %
MCH RBC QN AUTO: 28.8 PG (ref 26.5–33)
MCHC RBC AUTO-ENTMCNC: 33.3 G/DL (ref 31.5–36.5)
MCV RBC AUTO: 87 FL (ref 78–100)
MONOCYTES # BLD AUTO: 0.8 10E3/UL (ref 0–1.3)
MONOCYTES NFR BLD AUTO: 8 %
NEUTROPHILS # BLD AUTO: 7.4 10E3/UL (ref 1.6–8.3)
NEUTROPHILS NFR BLD AUTO: 74 %
NITRATE UR QL: NEGATIVE
PH UR STRIP: 7 [PH] (ref 5–7)
PLATELET # BLD AUTO: 286 10E3/UL (ref 150–450)
POTASSIUM SERPL-SCNC: 4.2 MMOL/L (ref 3.4–5.3)
PROT SERPL-MCNC: 6.9 G/DL (ref 6.4–8.3)
RBC # BLD AUTO: 4.96 10E6/UL (ref 3.8–5.9)
SODIUM SERPL-SCNC: 137 MMOL/L (ref 135–145)
SP GR UR STRIP: 1.01 (ref 1–1.03)
TRICHOMONAS, WET PREP: ABNORMAL
UROBILINOGEN UR STRIP-ACNC: 0.2 E.U./DL
WBC # BLD AUTO: 10 10E3/UL (ref 4–11)
WBC'S/HIGH POWER FIELD, WET PREP: ABNORMAL
YEAST, WET PREP: ABNORMAL

## 2025-03-04 PROCEDURE — 1125F AMNT PAIN NOTED PAIN PRSNT: CPT | Performed by: PHYSICIAN ASSISTANT

## 2025-03-04 PROCEDURE — 3074F SYST BP LT 130 MM HG: CPT | Performed by: PHYSICIAN ASSISTANT

## 2025-03-04 PROCEDURE — 80053 COMPREHEN METABOLIC PANEL: CPT | Performed by: PHYSICIAN ASSISTANT

## 2025-03-04 PROCEDURE — G2211 COMPLEX E/M VISIT ADD ON: HCPCS | Performed by: PHYSICIAN ASSISTANT

## 2025-03-04 PROCEDURE — 36415 COLL VENOUS BLD VENIPUNCTURE: CPT | Performed by: PHYSICIAN ASSISTANT

## 2025-03-04 PROCEDURE — 87210 SMEAR WET MOUNT SALINE/INK: CPT | Performed by: PHYSICIAN ASSISTANT

## 2025-03-04 PROCEDURE — 81003 URINALYSIS AUTO W/O SCOPE: CPT | Performed by: PHYSICIAN ASSISTANT

## 2025-03-04 PROCEDURE — 85025 COMPLETE CBC W/AUTO DIFF WBC: CPT | Performed by: PHYSICIAN ASSISTANT

## 2025-03-04 PROCEDURE — 74019 RADEX ABDOMEN 2 VIEWS: CPT | Mod: TC | Performed by: RADIOLOGY

## 2025-03-04 PROCEDURE — 99213 OFFICE O/P EST LOW 20 MIN: CPT | Performed by: PHYSICIAN ASSISTANT

## 2025-03-04 PROCEDURE — 3078F DIAST BP <80 MM HG: CPT | Performed by: PHYSICIAN ASSISTANT

## 2025-03-04 ASSESSMENT — PAIN SCALES - GENERAL: PAINLEVEL_OUTOF10: MODERATE PAIN (4)

## 2025-03-04 NOTE — PROGRESS NOTES
"  Assessment & Plan     Abdominal pain, right lower quadrant  Unclear etiology - start with KUB and labs with wet prep and urinalysis today; over the counter and supportive care discussed with patient as well. Consider further imaging with CT scan pending above. Return to clinic with any worsening or changes in symptoms and follow up with PCP for routine care.   - CBC with Platelets & Differential; Future  - Comprehensive metabolic panel; Future  - UA Macroscopic with reflex to Microscopic and Culture - Lab Collect; Future  - Wet prep - lab collect; Future  - XR Abdomen 2 Views; Future      BMI  Estimated body mass index is 25.28 kg/m  as calculated from the following:    Height as of this encounter: 1.702 m (5' 7\").    Weight as of this encounter: 73.2 kg (161 lb 6.4 oz).         See Patient Instructions    Subjective   Jas is a 29 year old, presenting for the following health issues:  Abdominal Pain        3/4/2025    11:10 AM   Additional Questions   Roomed by Mary Anne BERGERON   Accompanied by self     History of Present Illness       Reason for visit:  Lower center but goes towards right abdominal pain. no known injury  Symptom onset:  1-3 days ago (Sunday)  Symptoms include:  Pain that worsens with movement and urination. 4/10 pain scale, discomfort. radiates slightly to back. no nausea or vomiting.  Symptom intensity:  Moderate  Symptom progression:  Staying the same  Had these symptoms before:  Yes  Has tried/received treatment for these symptoms:  Yes  Previous treatment was successful:  Yes  Prior treatment description:  Not sure if this is the same cause,but i have had antibiotocs for vaginal infection that caused abdominal pain Jas is missing 2 dose(s) of medications per week.  Jsa is not taking prescribed medications regularly due to remembering to take.        History of CT scan of abdomin done 7/19/23 with notes of diverticulosis.  No changes in bowels  No fever, chills, night sweats.          Review of " "Systems  Constitutional, HEENT, cardiovascular, pulmonary, gi and gu systems are negative, except as otherwise noted.      Objective    /71 (BP Location: Left arm, Patient Position: Sitting, Cuff Size: Adult Regular)   Pulse 104   Temp 97.4  F (36.3  C) (Temporal)   Resp 16   Ht 1.702 m (5' 7\")   Wt 73.2 kg (161 lb 6.4 oz)   LMP 02/21/2025 (Approximate)   SpO2 95%   BMI 25.28 kg/m    Body mass index is 25.28 kg/m .  Physical Exam   GENERAL: alert and no distress  NECK: no adenopathy, no asymmetry, masses, or scars  RESP: lungs clear to auscultation - no rales, rhonchi or wheezes  CV: regular rate and rhythm, normal S1 S2, no S3 or S4, no murmur, click or rub, no peripheral edema  ABDOMEN: soft, slight tenderness to deep palpation along lower abdomen and right lower quadrant, no hepatosplenomegaly, no masses and bowel sounds normal  MS: no gross musculoskeletal defects noted, no edema  PSYCH: mentation appears normal, affect normal/bright    Xray - results pending official radiologist reading.        Signed Electronically by: Sukhi Reynolds PA-C    "

## 2025-03-06 NOTE — TELEPHONE ENCOUNTER
"The WBCs noted in the wet prep is a normal variant that tends to be from normal bacteria/skin cells in the area.  The WBC in the blood levels is within normal limits.  We can repeat this lab in 1 week with a lab only appointment to make sure it's not trending up though.  Order placed.  Thanks  Catrina \"Sukhi\" BEBA Reynolds   "

## 2025-03-06 NOTE — RESULT ENCOUNTER NOTE
"Araceli Medina  Your attached labs are reassuringly within normal limits.  Your abdominal xray does comment on a \"large amount\" of stool through your colon - this could be part of your symptoms.  It also notes phleboliths, which are stones - they seem to have passed given location, but this could also be related.    I would focus on moving your bowels more fully with increased water, fiber and even some over the counter support.    A diet high in fiber with plenty of fluids helps to maintain regular, soft bowel movements. The following foods are good sources of dietary fiber:  Cereals and breads: Whole grain cereal with bran, oatmeal, rolled oats, whole grain breads  Fruits: All fruits (fresh and dried), raisins, prunes, apricots, berries, figs  Vegetables: Any fresh vegetables, especially peas, broccoli, brussels sprouts, winter squash, green beans, cauliflower, lima beans, carrots  Other: Popcorn, brown rice  I recommend several things that you can work through to find some regularity:   1. Start metamucil one dose with water in the morning before breakfast or before lunch to increase fiber.   2. Use miralax once daily at night mixed with water before bed. This makes a non-absorbing liquid which will help to push stool out.   This can be increased to every 2-3 times a day as needed.   3. Start Colace 100mg bid, Senokot 2 tablet per day to 4 tablets two times a day OR Dulcolax 1-3 tablets per day prn constipation up to one tablet twice daily as a stool softener and to stimulate the bowels.   All of these medications you can get over the counter.   Eat high fiber foods such as cooked vegetables, most fruits, green leafy salads and beans.   Avoid high amounts of meat, bread - especially white bread, applesauce, rice, toast and bananas.        Please contact the office with any questions or concerns.    Catrina Quinteros \"Sukhi\" BEBA Reynolds    "

## 2025-03-24 DIAGNOSIS — F64.9 GENDER INCONGRUENCE: ICD-10-CM

## 2025-03-25 PROBLEM — F41.1 GAD (GENERALIZED ANXIETY DISORDER): Status: ACTIVE | Noted: 2022-08-10

## 2025-03-25 RX ORDER — TESTOSTERONE 1.62 MG/G
3 GEL TRANSDERMAL DAILY
Qty: 75 G | Refills: 5 | Status: SHIPPED | OUTPATIENT
Start: 2025-03-25

## 2025-08-10 ENCOUNTER — HEALTH MAINTENANCE LETTER (OUTPATIENT)
Age: 30
End: 2025-08-10

## (undated) DEVICE — SPONGE LAP 18X18" X8435

## (undated) DEVICE — GLOVE PROTEXIS BLUE W/NEU-THERA 7.0  2D73EB70

## (undated) DEVICE — SU MONOCRYL 4-0 PS-2 27" UND Y426H

## (undated) DEVICE — ENDO TROCAR FIRST ENTRY KII FIOS ADV FIX 05X100MM CFF03

## (undated) DEVICE — LINEN TOWEL PACK X5 5464

## (undated) DEVICE — KIT PATIENT POSITIONING PIGAZZI LATEX FREE 40580

## (undated) DEVICE — PAD CHUX UNDERPAD 23X24" 7136

## (undated) DEVICE — SOL WATER IRRIG 500ML BOTTLE 2F7113

## (undated) DEVICE — DRAIN JACKSON PRATT 15FR ROUND SU130-1323

## (undated) DEVICE — Device

## (undated) DEVICE — SOL NACL 0.9% IRRIG 500ML BOTTLE 2F7123

## (undated) DEVICE — ESU PENCIL W/HOLSTER E2350H

## (undated) DEVICE — DRSG KERLIX 4 1/2"X4YDS ROLL 6715

## (undated) DEVICE — ESU ELEC BLADE HEX-LOCKING 2.5" E1450X

## (undated) DEVICE — SOL NACL 0.9% INJ 1000ML BAG 2B1324X

## (undated) DEVICE — PACK MAJOR SBA15MAFSI

## (undated) DEVICE — SUCTION MANIFOLD NEPTUNE 2 SYS 4 PORT 0702-020-000

## (undated) DEVICE — SOL NACL 0.9% IRRIG 1000ML BOTTLE 2F7124

## (undated) DEVICE — PEN MARKING SKIN W/LABELS 31145884

## (undated) DEVICE — ESU GROUND PAD ADULT W/CORD E7507

## (undated) DEVICE — SU MONOCRYL 3-0 PS-2 27" Y427H

## (undated) DEVICE — SU PDS II 2-0 CT-1 27" Z339H

## (undated) DEVICE — DRAPE BREAST/CHEST 29420

## (undated) DEVICE — SU MONOCRYL 4-0 PS-2 18" UND Y496G

## (undated) DEVICE — SU ETHILON 3-0 FS-1 18" 669H

## (undated) DEVICE — SYR 10ML LL W/O NDL 302995

## (undated) DEVICE — GLOVE PROTEXIS W/NEU-THERA 6.5  2D73TE65

## (undated) DEVICE — PROTECTOR ARM ONE-STEP TRENDELENBURG 40418

## (undated) DEVICE — CATH TRAY FOLEY SURESTEP 16FR W/DRAIN BAG LF A300416A

## (undated) DEVICE — PREP CHLORAPREP 26ML TINTED ORANGE  260815

## (undated) DEVICE — NDL SPINAL 25GA 3.5" QUINCKE 405180

## (undated) DEVICE — ADH SKIN CLOSURE PREMIERPRO EXOFIN 1.0ML 3470

## (undated) DEVICE — DRAIN JACKSON PRATT RESERVOIR 100ML SU130-1305

## (undated) DEVICE — DRSG STERI STRIP 1X5" R1548

## (undated) DEVICE — BAG DECANTER STERILE WHITE DYNJDEC09

## (undated) DEVICE — BLADE CLIPPER SGL USE 9680

## (undated) DEVICE — STPL SKIN 35W ROTATING HEAD PRW35

## (undated) DEVICE — NDL INSUFFLATION 13GA 120MM C2201

## (undated) DEVICE — GLOVE BIOGEL PI MICRO SZ 6.5 48565

## (undated) DEVICE — SOL ADH LIQUID BENZOIN SWAB 0.6ML C1544

## (undated) DEVICE — PREP CHLORHEXIDINE 4% 4OZ (HIBICLENS) 57504

## (undated) RX ORDER — FENTANYL CITRATE 50 UG/ML
INJECTION, SOLUTION INTRAMUSCULAR; INTRAVENOUS
Status: DISPENSED
Start: 2024-12-30

## (undated) RX ORDER — GABAPENTIN 300 MG/1
CAPSULE ORAL
Status: DISPENSED
Start: 2022-03-25

## (undated) RX ORDER — LIDOCAINE HYDROCHLORIDE 20 MG/ML
INJECTION, SOLUTION EPIDURAL; INFILTRATION; INTRACAUDAL; PERINEURAL
Status: DISPENSED
Start: 2022-03-25

## (undated) RX ORDER — HYDROMORPHONE HCL IN WATER/PF 6 MG/30 ML
PATIENT CONTROLLED ANALGESIA SYRINGE INTRAVENOUS
Status: DISPENSED
Start: 2024-12-30

## (undated) RX ORDER — OXYCODONE HYDROCHLORIDE 5 MG/1
TABLET ORAL
Status: DISPENSED
Start: 2022-03-25

## (undated) RX ORDER — BUPIVACAINE HYDROCHLORIDE 2.5 MG/ML
INJECTION, SOLUTION EPIDURAL; INFILTRATION; INTRACAUDAL
Status: DISPENSED
Start: 2022-03-25

## (undated) RX ORDER — ACETAMINOPHEN 325 MG/1
TABLET ORAL
Status: DISPENSED
Start: 2022-03-25

## (undated) RX ORDER — PROPOFOL 10 MG/ML
INJECTION, EMULSION INTRAVENOUS
Status: DISPENSED
Start: 2022-03-25

## (undated) RX ORDER — CEFAZOLIN SODIUM/WATER 2 G/20 ML
SYRINGE (ML) INTRAVENOUS
Status: DISPENSED
Start: 2024-12-30

## (undated) RX ORDER — PROPOFOL 10 MG/ML
INJECTION, EMULSION INTRAVENOUS
Status: DISPENSED
Start: 2024-12-30

## (undated) RX ORDER — OXYCODONE HYDROCHLORIDE 5 MG/1
TABLET ORAL
Status: DISPENSED
Start: 2024-12-30

## (undated) RX ORDER — FENTANYL CITRATE 50 UG/ML
INJECTION, SOLUTION INTRAMUSCULAR; INTRAVENOUS
Status: DISPENSED
Start: 2022-03-25

## (undated) RX ORDER — ONDANSETRON 2 MG/ML
INJECTION INTRAMUSCULAR; INTRAVENOUS
Status: DISPENSED
Start: 2024-12-30

## (undated) RX ORDER — DEXAMETHASONE SODIUM PHOSPHATE 4 MG/ML
INJECTION, SOLUTION INTRA-ARTICULAR; INTRALESIONAL; INTRAMUSCULAR; INTRAVENOUS; SOFT TISSUE
Status: DISPENSED
Start: 2024-12-30

## (undated) RX ORDER — DEXAMETHASONE SODIUM PHOSPHATE 4 MG/ML
INJECTION, SOLUTION INTRA-ARTICULAR; INTRALESIONAL; INTRAMUSCULAR; INTRAVENOUS; SOFT TISSUE
Status: DISPENSED
Start: 2022-03-25

## (undated) RX ORDER — ONDANSETRON 2 MG/ML
INJECTION INTRAMUSCULAR; INTRAVENOUS
Status: DISPENSED
Start: 2022-03-25

## (undated) RX ORDER — ACETAMINOPHEN 500 MG
TABLET ORAL
Status: DISPENSED
Start: 2024-12-30